# Patient Record
Sex: FEMALE | Race: WHITE | Employment: PART TIME | URBAN - METROPOLITAN AREA
[De-identification: names, ages, dates, MRNs, and addresses within clinical notes are randomized per-mention and may not be internally consistent; named-entity substitution may affect disease eponyms.]

---

## 2018-10-31 ENCOUNTER — OFFICE VISIT (OUTPATIENT)
Dept: INTERNAL MEDICINE CLINIC | Age: 28
End: 2018-10-31

## 2018-10-31 VITALS
TEMPERATURE: 98.9 F | DIASTOLIC BLOOD PRESSURE: 85 MMHG | WEIGHT: 157.6 LBS | SYSTOLIC BLOOD PRESSURE: 110 MMHG | BODY MASS INDEX: 26.91 KG/M2 | HEART RATE: 78 BPM | RESPIRATION RATE: 18 BRPM | OXYGEN SATURATION: 98 % | HEIGHT: 64 IN

## 2018-10-31 DIAGNOSIS — G40.909 SEIZURE DISORDER (HCC): ICD-10-CM

## 2018-10-31 DIAGNOSIS — Z00.00 ROUTINE GENERAL MEDICAL EXAMINATION AT A HEALTH CARE FACILITY: Primary | ICD-10-CM

## 2018-10-31 DIAGNOSIS — F84.0 AUTISM SPECTRUM DISORDER: ICD-10-CM

## 2018-10-31 DIAGNOSIS — F41.1 GAD (GENERALIZED ANXIETY DISORDER): ICD-10-CM

## 2018-10-31 DIAGNOSIS — E55.9 VITAMIN D DEFICIENCY: ICD-10-CM

## 2018-10-31 NOTE — PROGRESS NOTES
Health Maintenance Due   Topic Date Due    DTaP/Tdap/Td series (1 - Tdap) 07/22/2010    PAP AKA CERVICAL CYTOLOGY  07/22/2010    Influenza Age 5 to Adult  08/01/2018       Chief Complaint   Patient presents with    New Patient     Establish Care    Anxiety    Epilepsy     Triggered by anxiety; Last 2016    Weight Gain     Concerned; has one kidney       1. Have you been to the ER, urgent care clinic since your last visit? Hospitalized since your last visit? No    2. Have you seen or consulted any other health care providers outside of the [address] [place] [address] since your last visit? Include any pap smears or colon screening. No    3) Do you have an Advance Directive on file? no    4) Are you interested in receiving information on Advance Directives? NO      Patient is accompanied by self I have received verbal consent from Salvador Mason to discuss any/all medical information while they are present in the room. Patient refused flu shot when offered.

## 2018-10-31 NOTE — PATIENT INSTRUCTIONS

## 2018-10-31 NOTE — PROGRESS NOTES
HISTORY OF PRESENT ILLNESS  Dillon Palacio is a 33 y.o. female here to establish care. 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 She is doing well since then. Had a history of seizure disorder, possible generalized epilepsy or partial seizure, she mentioned her seizure attack is 1 in every 2 years. She is not on antiseizure medications. She has seen and neurologist is in [place] several years back. No recent EEG done last EEG done in approximately during 2001. She has autism, living independently in [facility]. Working full-time. Had generalized anxiety disorder, use Klonopin rarely. Has gained weight. Worried about her weight. Well woman visit is up-to-date. HPI    Review of Systems   Constitutional: Negative. HENT: Negative. Eyes: Negative. Respiratory: Negative. Cardiovascular: Negative. Gastrointestinal: Negative. Genitourinary: Negative. Musculoskeletal: Negative. Skin: Negative. Neurological: Negative. Endo/Heme/Allergies: Negative. Psychiatric/Behavioral: Negative. Physical Exam   Constitutional: She is oriented to person, place, and time. She appears well-developed and well-nourished. No distress. HENT:   Head: Normocephalic and atraumatic. Right Ear: External ear normal.   Left Ear: External ear normal.   Nose: Nose normal.   Mouth/Throat: Oropharynx is clear and moist. No oropharyngeal exudate. Eyes: Conjunctivae and EOM are normal. Pupils are equal, round, and reactive to light. Neck: Normal range of motion. Neck supple. No JVD present. No thyromegaly present. Cardiovascular: Normal rate, regular rhythm, normal heart sounds and intact distal pulses. Pulmonary/Chest: Effort normal and breath sounds normal. No respiratory distress. She has no wheezes. Abdominal: Soft. Bowel sounds are normal. She exhibits no distension. There is no tenderness.    Musculoskeletal: She exhibits no edema or tenderness. Lymphadenopathy:     She has no cervical adenopathy. Neurological: She is alert and oriented to person, place, and time. She has normal reflexes. She displays normal reflexes. No cranial nerve deficit. She exhibits normal muscle tone. Coordination normal.   Skin:   Skin freckle present   Psychiatric: She has a normal mood and affect. Her behavior is normal. Judgment and thought content normal.   Autistic       ASSESSMENT and PLAN  Diagnoses and all orders for this visit:    1. Routine general medical examination at a health care facility    seems healthy. Advised to eat healthy and exercise. Will check,  -     CBC WITH AUTOMATED DIFF  -     METABOLIC PANEL, COMPREHENSIVE  -     LIPID PANEL  -     TSH 3RD GENERATION  -     URINALYSIS W/ RFLX MICROSCOPIC    2. Seizure disorder ([facility] [address]    Had history of partial seizure or epilepsy. Not on antiseizure medications. She gets seizure attack one in every 2 years. Last EEG done at least 10 years back. Would like to get reevaluated. Will refer,  -     REFERRAL TO NEUROLOGY    3. Vitamin D deficiency   will check,      -     VITAMIN D, 25 HYDROXY    4. NURY (generalized anxiety disorder)    Taking Klonopin as needed. 5. Autism spectrum disorder    Stable. Living independently. Discussed expected course/resolution/complications of diagnosis in detail with patient. Medication risks/benefits/costs/interactions/alternatives discussed with patient. Pt was given an after visit summary which includes diagnoses, current medications & vitals. Pt expressed understanding with the diagnosis and plan.

## 2019-02-07 ENCOUNTER — TELEPHONE (OUTPATIENT)
Dept: INTERNAL MEDICINE CLINIC | Age: 29
End: 2019-02-07

## 2019-02-07 NOTE — TELEPHONE ENCOUNTER
Call placed to ptDIPAK on VM stating for pt to call and schedule an appointment or that she can wait until follow up appointment to discuss whatever cancer screening that she is inquiring about

## 2019-02-07 NOTE — TELEPHONE ENCOUNTER
----- Message from Darrel Hart sent at 2/6/2019  4:39 PM EST -----  Regarding: DR DELGADO / TELEPHONE  Pt is requesting cancer screening paperwork to be emailed [email or web address]        Best Contact: [phone]

## 2019-02-16 LAB
25(OH)D3+25(OH)D2 SERPL-MCNC: 22.2 NG/ML (ref 30–100)
ALBUMIN SERPL-MCNC: 4.3 G/DL (ref 3.5–5.5)
ALBUMIN/GLOB SERPL: 1.3 {RATIO} (ref 1.2–2.2)
ALP SERPL-CCNC: 93 IU/L (ref 39–117)
ALT SERPL-CCNC: 28 IU/L (ref 0–32)
APPEARANCE UR: CLEAR
AST SERPL-CCNC: 18 IU/L (ref 0–40)
BASOPHILS # BLD AUTO: 0 X10E3/UL (ref 0–0.2)
BASOPHILS NFR BLD AUTO: 0 %
BILIRUB SERPL-MCNC: 0.2 MG/DL (ref 0–1.2)
BILIRUB UR QL STRIP: NEGATIVE
BUN SERPL-MCNC: 11 MG/DL (ref 6–20)
BUN/CREAT SERPL: 16 (ref 9–23)
CALCIUM SERPL-MCNC: 9.3 MG/DL (ref 8.7–10.2)
CHLORIDE SERPL-SCNC: 104 MMOL/L (ref 96–106)
CHOLEST SERPL-MCNC: 165 MG/DL (ref 100–199)
CO2 SERPL-SCNC: 20 MMOL/L (ref 20–29)
COLOR UR: YELLOW
CREAT SERPL-MCNC: 0.67 MG/DL (ref 0.57–1)
EOSINOPHIL # BLD AUTO: 0.1 X10E3/UL (ref 0–0.4)
EOSINOPHIL NFR BLD AUTO: 1 %
ERYTHROCYTE [DISTWIDTH] IN BLOOD BY AUTOMATED COUNT: 12.7 % (ref 12.3–15.4)
GLOBULIN SER CALC-MCNC: 3.3 G/DL (ref 1.5–4.5)
GLUCOSE SERPL-MCNC: 86 MG/DL (ref 65–99)
GLUCOSE UR QL: NEGATIVE
HCT VFR BLD AUTO: 41.9 % (ref 34–46.6)
HDLC SERPL-MCNC: 42 MG/DL
HGB BLD-MCNC: 13.5 G/DL (ref 11.1–15.9)
HGB UR QL STRIP: NEGATIVE
IMM GRANULOCYTES # BLD AUTO: 0 X10E3/UL (ref 0–0.1)
IMM GRANULOCYTES NFR BLD AUTO: 0 %
INTERPRETATION, 910389: NORMAL
KETONES UR QL STRIP: NEGATIVE
LDLC SERPL CALC-MCNC: 89 MG/DL (ref 0–99)
LEUKOCYTE ESTERASE UR QL STRIP: NEGATIVE
LYMPHOCYTES # BLD AUTO: 2.5 X10E3/UL (ref 0.7–3.1)
LYMPHOCYTES NFR BLD AUTO: 29 %
MCH RBC QN AUTO: 28.4 PG (ref 26.6–33)
MCHC RBC AUTO-ENTMCNC: 32.2 G/DL (ref 31.5–35.7)
MCV RBC AUTO: 88 FL (ref 79–97)
MICRO URNS: NORMAL
MONOCYTES # BLD AUTO: 0.9 X10E3/UL (ref 0.1–0.9)
MONOCYTES NFR BLD AUTO: 10 %
NEUTROPHILS # BLD AUTO: 5.2 X10E3/UL (ref 1.4–7)
NEUTROPHILS NFR BLD AUTO: 60 %
NITRITE UR QL STRIP: NEGATIVE
PH UR STRIP: 6 [PH] (ref 5–7.5)
PLATELET # BLD AUTO: 409 X10E3/UL (ref 150–379)
POTASSIUM SERPL-SCNC: 4.3 MMOL/L (ref 3.5–5.2)
PROT SERPL-MCNC: 7.6 G/DL (ref 6–8.5)
PROT UR QL STRIP: NEGATIVE
RBC # BLD AUTO: 4.75 X10E6/UL (ref 3.77–5.28)
SODIUM SERPL-SCNC: 140 MMOL/L (ref 134–144)
SP GR UR: 1.02 (ref 1–1.03)
TRIGL SERPL-MCNC: 172 MG/DL (ref 0–149)
TSH SERPL DL<=0.005 MIU/L-ACNC: 1.69 UIU/ML (ref 0.45–4.5)
UROBILINOGEN UR STRIP-MCNC: 1 MG/DL (ref 0.2–1)
VLDLC SERPL CALC-MCNC: 34 MG/DL (ref 5–40)
WBC # BLD AUTO: 8.7 X10E3/UL (ref 3.4–10.8)

## 2019-02-20 NOTE — PROGRESS NOTES
High platelet count. Probable thrombocytosis. Refer patient to hematologist Dr. Jazlyn Hughes. Low vitamin D, advised to take vitamin D3 2000 units a day for 4 months. Slightly elevated triglyceride level, watch carbohydrate.

## 2019-04-29 ENCOUNTER — OFFICE VISIT (OUTPATIENT)
Dept: INTERNAL MEDICINE CLINIC | Age: 29
End: 2019-04-29

## 2019-04-29 VITALS
OXYGEN SATURATION: 97 % | RESPIRATION RATE: 18 BRPM | BODY MASS INDEX: 25.88 KG/M2 | DIASTOLIC BLOOD PRESSURE: 69 MMHG | WEIGHT: 151.6 LBS | HEIGHT: 64 IN | SYSTOLIC BLOOD PRESSURE: 133 MMHG | HEART RATE: 91 BPM | TEMPERATURE: 97.6 F

## 2019-04-29 DIAGNOSIS — F41.1 GAD (GENERALIZED ANXIETY DISORDER): ICD-10-CM

## 2019-04-29 DIAGNOSIS — R79.89 ELEVATED PLATELET COUNT: ICD-10-CM

## 2019-04-29 DIAGNOSIS — E55.9 VITAMIN D DEFICIENCY: ICD-10-CM

## 2019-04-29 DIAGNOSIS — L24.5 IRRITANT CONTACT DERMATITIS DUE TO OTHER CHEMICAL PRODUCTS: Primary | ICD-10-CM

## 2019-04-29 LAB — PLATELET # BLD AUTO: 420 X10E3/UL (ref 150–379)

## 2019-04-29 RX ORDER — BETAMETHASONE VALERATE 1.2 MG/G
OINTMENT TOPICAL DAILY
Qty: 15 G | Refills: 0 | Status: SHIPPED | OUTPATIENT
Start: 2019-04-29 | End: 2021-07-17

## 2019-04-29 RX ORDER — PREDNISONE 5 MG/1
5 TABLET ORAL 2 TIMES DAILY
Qty: 14 TAB | Refills: 0 | Status: SHIPPED | OUTPATIENT
Start: 2019-04-29 | End: 2020-07-07 | Stop reason: ALTCHOICE

## 2019-04-29 RX ORDER — CLONAZEPAM 0.5 MG/1
0.5 TABLET ORAL AS NEEDED
Qty: 30 TAB | Refills: 2 | Status: SHIPPED | OUTPATIENT
Start: 2019-04-29

## 2019-04-29 NOTE — PROGRESS NOTES
Asha Wynne is a 33 y.o. female    Chief Complaint   Patient presents with    Skin Problem    Anxiety    Seizure     1. Have you been to the ER, urgent care clinic since your last visit? Hospitalized since your last visit? No     2. Have you seen or consulted any other health care providers outside of the [address] [place] [address] since your last visit? Include any pap smears or colon screening.   No      Visit Vitals  /69   Pulse 91   Temp 97.6 °F (36.4 °C) (Oral)   Resp 18   Ht 5' 4\" (1.626 m)   Wt 151 lb 9.6 oz (68.8 kg)   SpO2 97%   BMI 26.02 kg/m²

## 2019-04-29 NOTE — LETTER
NOTIFICATION RETURN TO WORK / SCHOOL 
 
4/29/2019 10:03 AM 
 
Ms. Mathew Miles [place] [address] [id number] To Whom It May Concern: 
 
Mathew Miles is currently under the care of [address]. She is advised not to be in contact with any chemical product since she has developed contact dermatitis. If there are questions or concerns please have the patient contact our office. Sincerely, Joellen Dyer MD

## 2019-04-29 NOTE — PATIENT INSTRUCTIONS

## 2019-04-29 NOTE — PROGRESS NOTES
HISTORY OF PRESENT ILLNESS  Miryam Ivy is a 33 y.o. female here to follow-up. Reports rash in both hands. 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 Chemical product. Hands are itchy, having rash. Her mom gave her topical cream, not helping her. Have anxiety attack, she is not coming to college to finish her bachelor's. Sleeping well. Need a refill of Klonopin. She has autism, living independently in [place]. Working full-time. Labs reviewed with her. Platelet count is high. Repeated. Has low vitamin D. HPI      Review of Systems   Constitutional: Negative. HENT: Negative. Eyes: Negative. Respiratory: Negative. Cardiovascular: Negative. Gastrointestinal: Negative. Genitourinary: Negative. Musculoskeletal: Negative. Skin: Positive for itching and rash. Neurological: Negative. Endo/Heme/Allergies: Negative. Psychiatric/Behavioral: The patient is nervous/anxious. Physical Exam   Constitutional: She appears well-developed and well-nourished. No distress. Eyes: Pupils are equal, round, and reactive to light. Conjunctivae and EOM are normal.   Neck: Normal range of motion. Neck supple. No JVD present. No thyromegaly present. Cardiovascular: Normal rate, regular rhythm, normal heart sounds and intact distal pulses. Pulmonary/Chest: Effort normal and breath sounds normal. No respiratory distress. She has no wheezes. Abdominal: Soft. Bowel sounds are normal. She exhibits no distension. There is no tenderness. Musculoskeletal: She exhibits no edema or tenderness. Lymphadenopathy:     She has no cervical adenopathy. Skin: Skin is dry. Rash noted. Bilateral hand: Dry scaly lesion and sandpaper lesion present in all fingers and hands. Itchy. Psychiatric: She has a normal mood and affect.  Her behavior is normal.   Autistic,anxious       ASSESSMENT and PLAN    Diagnoses and all orders for this visit:    1. Irritant contact dermatitis due to other chemical products    She needs to avoid chemicals at work. Will write a letter. Will give,  -     betamethasone valerate (VALISONE) 0.1 % ointment; Apply  to affected area daily. -     predniSONE (DELTASONE) 5 mg tablet; Take 1 Tab by mouth two (2) times a day. 2. Elevated platelet count    Will repeat,  -     PLATELET COUNT    If elevated, will refer,  -     REFERRAL TO HEMATOLOGY ONCOLOGY    3. Vitamin D deficiency     advised to take vitamin D3 2000 units once a day for 4-month. 4. NURY (generalized anxiety disorder)    Doing well. Will refill,  -     clonazePAM (KLONOPIN) 0.5 mg tablet; Take 1 Tab by mouth as needed for Other (anxiety). Max Daily Amount: 48 mg. #30, with 2 refill. Follow-up in 4 months. Discussed expected course/resolution/complications of diagnosis in detail with patient. Medication risks/benefits/costs/interactions/alternatives discussed with patient. Pt was given an after visit summary which includes diagnoses, current medications & vitals. Pt expressed understanding with the diagnosis and plan.

## 2019-04-30 ENCOUNTER — TELEPHONE (OUTPATIENT)
Dept: INTERNAL MEDICINE CLINIC | Age: 29
End: 2019-04-30

## 2019-05-01 NOTE — TELEPHONE ENCOUNTER
Call placed to [facility], patients place of employment, and she needed clarification regarding patients job. 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

## 2019-05-01 NOTE — TELEPHONE ENCOUNTER
----- Message from Maureen Dolan sent at 5/1/2019 10:07 AM EDT -----  Regarding: Dr. Consuello Denver  Pt returning a call from the practice but is unsure of the nature of the call.  Callback: [phone]

## 2019-05-03 DIAGNOSIS — D69.1 PLATELET DYSFUNCTION (HCC): Primary | ICD-10-CM

## 2019-05-08 ENCOUNTER — OFFICE VISIT (OUTPATIENT)
Dept: ONCOLOGY | Age: 29
End: 2019-05-08

## 2019-05-08 VITALS
SYSTOLIC BLOOD PRESSURE: 137 MMHG | OXYGEN SATURATION: 98 % | DIASTOLIC BLOOD PRESSURE: 80 MMHG | BODY MASS INDEX: 26.05 KG/M2 | RESPIRATION RATE: 16 BRPM | HEART RATE: 100 BPM | HEIGHT: 64 IN | WEIGHT: 152.6 LBS | TEMPERATURE: 98.4 F

## 2019-05-08 DIAGNOSIS — F84.0 AUTISM: ICD-10-CM

## 2019-05-08 DIAGNOSIS — D75.839 THROMBOCYTOSIS: Primary | ICD-10-CM

## 2019-05-08 DIAGNOSIS — D75.839 THROMBOCYTOSIS: ICD-10-CM

## 2019-05-08 NOTE — PROGRESS NOTES
[facility] [place] [address], [place]: [phone]  F: [phone]    Reason for Visit:   Max Evans is a 33 y.o. female who is seen in consultation at the request of Dr. Pastor Centeno for evaluation of thrombocytosis      History of Present Illness:   Patient is a 33 y.o. female seen for thrombocytosis     She was noted to have an abnormal platelet count of 427I on 2/15/2019 which remained elevated when rechecked on 4/29/2019 at 420 okay. No other CBC abnormalities. She has no bleeding, no dark stools, she eats a normal diet, no fevers and no chills, no new lumps or bumps. She has had no vision changes. She was on prednisone for contact dermatitis that she started 4/29/19. She had dermatitis for 1.5 month prior to that. No CP,  SOB, HA, falls, dysuria, discharge. She had a Wilms tumor that was resected in 1991. Past Medical History:   Diagnosis Date    Autism disorder     Cancer ([facility] [address]     kidney,wilms tumor    Psychotic disorder ([facility] [address]     NURY    Seizure ([facility] [address]     Seizures ([facility] [address]     ? epilepsy    Wilm's tumor (nephroblastoma) ([facility] [address]       Past Surgical History:   Procedure Laterality Date    HX UROLOGICAL      nephrectomy not sure which side      Social History     Tobacco Use    Smoking status: Never Smoker    Smokeless tobacco: Never Used   Substance Use Topics    Alcohol use: No      Family History   Adopted: Yes     Current Outpatient Medications   Medication Sig    clonazePAM (KLONOPIN) 0.5 mg tablet Take 1 Tab by mouth as needed for Other (anxiety). Max Daily Amount: 48 mg.  levonorgestrel-ethinyl estradiol (SEASONALE CONTRACEPTIVE) 0.15-30 mg-mcg per tablet Take 1 Tab by mouth daily. Indications: PREGNANCY CONTRACEPTION    betamethasone valerate (VALISONE) 0.1 % ointment Apply  to affected area daily.  predniSONE (DELTASONE) 5 mg tablet Take 1 Tab by mouth two (2) times a day.      No current facility-administered medications for this visit. Allergies   Allergen Reactions    Penicillins Other (comments)     Severe depression        Review of Systems: A complete review of systems was obtained, negative except as described above. Physical Exam:     Visit Vitals  Ht 5' 4\" (1.626 m)   Wt 152 lb 9.6 oz (69.2 kg)   LMP 02/08/2019 (Approximate)   BMI 26.19 kg/m²     ECOG PS: 0  General: No distress  Eyes: PERRLA, anicteric sclerae  HENT: Atraumatic, OP clear  Neck: Supple  Lymphatic: No cervical, supraclavicular, or inguinal adenopathy  Respiratory: CTAB, normal respiratory effort  CV: Normal rate, regular rhythm, no murmurs, no peripheral edema  GI: Soft, nontender, nondistended, no masses, no hepatomegaly, no splenomegaly  MS: Normal gait and station. Digits without clubbing or cyanosis. Skin: No rashes, ecchymoses, or petechiae. Normal temperature, turgor, and texture. Psych: Alert, oriented, appropriate affect, normal judgment/insight    Results:     Lab Results   Component Value Date/Time    WBC 8.7 02/15/2019 03:35 PM    HGB 13.5 02/15/2019 03:35 PM    HCT 41.9 02/15/2019 03:35 PM    PLATELET 636 (H) 36/60/3330 10:34 AM    MCV 88 02/15/2019 03:35 PM    ABS. NEUTROPHILS 5.2 02/15/2019 03:35 PM     Lab Results   Component Value Date/Time    Sodium 140 02/15/2019 03:35 PM    Potassium 4.3 02/15/2019 03:35 PM    Chloride 104 02/15/2019 03:35 PM    CO2 20 02/15/2019 03:35 PM    Glucose 86 02/15/2019 03:35 PM    BUN 11 02/15/2019 03:35 PM    Creatinine 0.67 02/15/2019 03:35 PM    GFR est  02/15/2019 03:35 PM    GFR est non- 02/15/2019 03:35 PM    Calcium 9.3 02/15/2019 03:35 PM     Lab Results   Component Value Date/Time    Bilirubin, total 0.2 02/15/2019 03:35 PM    ALT (SGPT) 28 02/15/2019 03:35 PM    AST (SGOT) 18 02/15/2019 03:35 PM    Alk.  phosphatase 93 02/15/2019 03:35 PM    Protein, total 7.6 02/15/2019 03:35 PM    Albumin 4.3 02/15/2019 03:35 PM    Globulin 4.3 (H) 03/22/2014 02:30 PM     Lab Results Component Value Date/Time    TSH 1.690 02/15/2019 03:35 PM     No results found for: INR, APTT, DDIMSQ, DDIME, [id number], Philmichaelon Ahle, FDPLT, Chrystie Bold, PRSFLT, [address], [address], [id number], [id number], [id number], [id number], [id number], [id number]    Records reviewed and summarized above. Pathology report(s) reviewed above. Radiology report(s) reviewed above. Assessment:   1) Thrombocytosis    Mild but persistent  Discussed causes of thrombocytosis which may be reactive or autonomous    Will r/o reactive causes and splenomegaly  If no red flags will monitor as my suspicion for a MPN is low      2) H/O Wilms tumor      3) Autism      4) Seizure disorder      Plan:     · Cbc diff, smear, LD, iron studies, USG abdomen    RTC 3-4 weeks    I appreciate the opportunity to participate in Ms. Marzena Santos's care.     Signed By: Rosalino Gu MD

## 2019-05-08 NOTE — PROGRESS NOTES
Reviewed record in preparation for visit and have obtained necessary documentation. Identified pt with two pt identifiers(name and ). Health Maintenance Due   Topic    DTaP/Tdap/Td series (1 - Tdap)    PAP AKA CERVICAL CYTOLOGY          Chief Complaint   Patient presents with    New Patient     Ref by Dr. Bailey Mcadams. DX: Elevated Platelets        Wt Readings from Last 3 Encounters:   19 152 lb 9.6 oz (69.2 kg)   19 151 lb 9.6 oz (68.8 kg)   10/31/18 157 lb 9.6 oz (71.5 kg)     Temp Readings from Last 3 Encounters:   19 97.6 °F (36.4 °C) (Oral)   10/31/18 98.9 °F (37.2 °C) (Oral)   14 98 °F (36.7 °C)     BP Readings from Last 3 Encounters:   19 133/69   10/31/18 110/85   14 108/66     Pulse Readings from Last 3 Encounters:   19 91   10/31/18 78   14 86           Learning Assessment:  :     Learning Assessment 2019 10/31/2018   PRIMARY LEARNER Patient Patient   HIGHEST LEVEL OF EDUCATION - PRIMARY LEARNER  SOME COLLEGE SOME COLLEGE   BARRIERS PRIMARY LEARNER NONE NONE   PRIMARY LANGUAGE ENGLISH ENGLISH   LEARNER PREFERENCE PRIMARY VIDEOS DEMONSTRATION   ANSWERED BY patient Patient   RELATIONSHIP SELF SELF       Depression Screening:  :     3 most recent PHQ Screens 2019   Little interest or pleasure in doing things Not at all   Feeling down, depressed, irritable, or hopeless Not at all   Total Score PHQ 2 0       Fall Risk Assessment:  :     Fall Risk Assessment, last 12 mths 2019   Able to walk? Yes   Fall in past 12 months? No       Abuse Screening:  :     Abuse Screening Questionnaire 2019 10/31/2018   Do you ever feel afraid of your partner? N N   Are you in a relationship with someone who physically or mentally threatens you? N N   Is it safe for you to go home? Y Y       Coordination of Care Questionnaire:  :     1) Have you been to an emergency room, urgent care clinic since your last visit?  Urgent care 3/2019 DX: Allergies  Hospitalized since your last visit? no             2) Have you seen or consulted any other health care providers outside of [address] [place] [address] since your last visit? no  (Include any pap smears or colon screenings in this section.)    3) Do you have an Advance Directive on file? no    4) Are you interested in receiving information on Advance Directives? NO      Patient is accompanied by friend/  I have received verbal consent from Francesco Burton to discuss any/all medical information while they are present in the room.

## 2019-05-13 ENCOUNTER — HOSPITAL ENCOUNTER (OUTPATIENT)
Dept: ULTRASOUND IMAGING | Age: 29
Discharge: HOME | End: 2019-05-13
Attending: INTERNAL MEDICINE
Payer: COMMERCIAL

## 2019-05-13 DIAGNOSIS — D75.839 THROMBOCYTOSIS: ICD-10-CM

## 2019-05-13 PROCEDURE — 76700 US EXAM ABDOM COMPLETE: CPT

## 2019-05-14 LAB
BASOPHILS # BLD AUTO: 0 X10E3/UL (ref 0–0.2)
BASOPHILS NFR BLD AUTO: 0 %
EOSINOPHIL # BLD AUTO: 0.1 X10E3/UL (ref 0–0.4)
EOSINOPHIL NFR BLD AUTO: 1 %
ERYTHROCYTE [DISTWIDTH] IN BLOOD BY AUTOMATED COUNT: 13.7 % (ref 12.3–15.4)
FERRITIN SERPL-MCNC: 69 NG/ML (ref 15–150)
HCT VFR BLD AUTO: 40.5 % (ref 34–46.6)
HGB BLD-MCNC: 13.5 G/DL (ref 11.1–15.9)
IMM GRANULOCYTES # BLD AUTO: 0 X10E3/UL (ref 0–0.1)
IMM GRANULOCYTES NFR BLD AUTO: 0 %
IRON SATN MFR SERPL: 31 % (ref 15–55)
IRON SERPL-MCNC: 125 UG/DL (ref 27–159)
LDH SERPL-CCNC: 257 IU/L (ref 119–226)
LYMPHOCYTES # BLD AUTO: 2.2 X10E3/UL (ref 0.7–3.1)
LYMPHOCYTES NFR BLD AUTO: 23 %
MCH RBC QN AUTO: 28.5 PG (ref 26.6–33)
MCHC RBC AUTO-ENTMCNC: 33.3 G/DL (ref 31.5–35.7)
MCV RBC AUTO: 85 FL (ref 79–97)
MONOCYTES # BLD AUTO: 0.8 X10E3/UL (ref 0.1–0.9)
MONOCYTES NFR BLD AUTO: 9 %
NEUTROPHILS # BLD AUTO: 6.2 X10E3/UL (ref 1.4–7)
NEUTROPHILS NFR BLD AUTO: 67 %
PLATELET # BLD AUTO: 362 X10E3/UL (ref 150–379)
RBC # BLD AUTO: 4.74 X10E6/UL (ref 3.77–5.28)
TIBC SERPL-MCNC: 399 UG/DL (ref 250–450)
UIBC SERPL-MCNC: 274 UG/DL (ref 131–425)
WBC # BLD AUTO: 9.4 X10E3/UL (ref 3.4–10.8)

## 2019-05-21 ENCOUNTER — OFFICE VISIT (OUTPATIENT)
Dept: ONCOLOGY | Age: 29
End: 2019-05-21

## 2019-05-21 VITALS
WEIGHT: 152.4 LBS | BODY MASS INDEX: 26.02 KG/M2 | HEIGHT: 64 IN | RESPIRATION RATE: 18 BRPM | OXYGEN SATURATION: 97 % | HEART RATE: 104 BPM | SYSTOLIC BLOOD PRESSURE: 111 MMHG | TEMPERATURE: 98.6 F | DIASTOLIC BLOOD PRESSURE: 73 MMHG

## 2019-05-21 DIAGNOSIS — D75.839 THROMBOCYTOSIS: Primary | ICD-10-CM

## 2019-05-21 DIAGNOSIS — Q44.7: ICD-10-CM

## 2019-05-21 DIAGNOSIS — F84.0 AUTISM: ICD-10-CM

## 2019-05-21 NOTE — PROGRESS NOTES
[facility] [address] [place], [address], [place] [facility]: [phone]  F: [phone]    Reason for Visit:   Francesco Burton is a 33 y.o. female who is seen in follow up for  thrombocytosis    History of Present Illness:   Patient is a 33 y.o. female seen for thrombocytosis     She was noted to have an abnormal platelet count of 827M on 2/15/2019 which remained elevated when rechecked on 4/29/2019 at 420k. No other CBC abnormalities. She was on prednisone for contact dermatitis that she started 4/29/19. She had dermatitis for 1.5 month prior to that. This is since resolved. She also has a history of Wilms tumor that was resected in [facility] with her supportive father today. Has no additional lesions of dermatitis since last seen. No bleeding no fevers no abdominal pain no weight changes. Past Medical History:   Diagnosis Date    Autism disorder     Cancer ([facility] [address]     kidney,wilms tumor    Psychotic disorder ([facility] [address]     NURY    Seizure ([facility] [address]     Seizures ([address]     ? epilepsy    Wilm's tumor (nephroblastoma) ([facility] [address]       Past Surgical History:   Procedure Laterality Date    HX UROLOGICAL      nephrectomy not sure which side      Social History     Tobacco Use    Smoking status: Never Smoker    Smokeless tobacco: Never Used   Substance Use Topics    Alcohol use: No      Family History   Adopted: Yes     Current Outpatient Medications   Medication Sig    clonazePAM (KLONOPIN) 0.5 mg tablet Take 1 Tab by mouth as needed for Other (anxiety). Max Daily Amount: 48 mg.  levonorgestrel-ethinyl estradiol (SEASONALE CONTRACEPTIVE) 0.15-30 mg-mcg per tablet Take 1 Tab by mouth daily. Indications: PREGNANCY CONTRACEPTION    betamethasone valerate (VALISONE) 0.1 % ointment Apply  to affected area daily.  predniSONE (DELTASONE) 5 mg tablet Take 1 Tab by mouth two (2) times a day. No current facility-administered medications for this visit.        Allergies Allergen Reactions    Penicillins Other (comments)     Severe depression        Review of Systems: A complete review of systems was obtained, negative except as described above. Physical Exam:     Visit Vitals  /73 (BP 1 Location: Left arm, BP Patient Position: Sitting)   Pulse (!) 104   Temp 98.6 °F (37 °C) (Oral)   Resp 18   Ht 5' 4\" (1.626 m)   Wt 152 lb 6.4 oz (69.1 kg)   LMP 05/07/2019 (Approximate)   SpO2 97%   BMI 26.16 kg/m²     ECOG PS: 0  General: No distress  Eyes: PERRLA, anicteric sclerae  HENT: Atraumatic, OP clear  Neck: Supple  Skin: No rashes, ecchymoses, or petechiae. Normal temperature, turgor, and texture. Psych: Alert, oriented, appropriate affect, normal judgment/insight    Results:     Lab Results   Component Value Date/Time    WBC 9.4 05/13/2019 10:01 AM    HGB 13.5 05/13/2019 10:01 AM    HCT 40.5 05/13/2019 10:01 AM    PLATELET 146 91/96/6253 10:01 AM    MCV 85 05/13/2019 10:01 AM    ABS. NEUTROPHILS 6.2 05/13/2019 10:01 AM     Lab Results   Component Value Date/Time    Sodium 140 02/15/2019 03:35 PM    Potassium 4.3 02/15/2019 03:35 PM    Chloride 104 02/15/2019 03:35 PM    CO2 20 02/15/2019 03:35 PM    Glucose 86 02/15/2019 03:35 PM    BUN 11 02/15/2019 03:35 PM    Creatinine 0.67 02/15/2019 03:35 PM    GFR est  02/15/2019 03:35 PM    GFR est non- 02/15/2019 03:35 PM    Calcium 9.3 02/15/2019 03:35 PM     Lab Results   Component Value Date/Time    Bilirubin, total 0.2 02/15/2019 03:35 PM    ALT (SGPT) 28 02/15/2019 03:35 PM    AST (SGOT) 18 02/15/2019 03:35 PM    Alk.  phosphatase 93 02/15/2019 03:35 PM    Protein, total 7.6 02/15/2019 03:35 PM    Albumin 4.3 02/15/2019 03:35 PM    Globulin 4.3 (H) 03/22/2014 02:30 PM     Lab Results   Component Value Date/Time    Iron % saturation 31 05/13/2019 10:01 AM    TIBC 399 05/13/2019 10:01 AM    Ferritin 69 05/13/2019 10:01 AM     (H) 05/13/2019 10:01 AM    TSH 1.690 02/15/2019 03:35 PM     No results found for: INR, APTT, DDIMSQ, DDIME, [id number], Maudie Leyland, FDPLT, Nelma Lauth, [address] [facility] [address], [facility], [id number], [id number], [id number], [id number], [id number], [id number]    Records reviewed and summarized above. Pathology report(s) reviewed above. Radiology report(s) reviewed above. USG abdomen  IMPRESSION  IMPRESSION:   1. Multiple hyperechoic lesions within the liver. These may represent  hemangiomas. If any preexisting imaging confirms this diagnosis comparison is  recommended alternatively MR imaging with contrast could confirm  2. The spleen is not enlarged  3. Post right nephrectomy    Assessment:   1) Thrombocytosis    Mild but persistent  Discussed causes of thrombocytosis which may be reactive or autonomous. Perhaps reactive to contact dermatitis. Thrombocytosis on repeat CBC has resolved. She has no evidence of iron deficient no other CBC abnormalities. Can continue to have her CBC observed with her primary care physician for now. I reviewed ultrasound findings with her parents today. Multiple lesions seen in the liver which are likely hemangiomas. They are quite concerned given her history of lymphedema. I reassured them that this is likely a benign finding but will proceed with an MRI abdomen as was recommended by radiology and call with the results. 2) H/O Wilms tumor  I generally do not see patients with Wilms tumor as this is a pediatric disease. However I reviewed guidelines and it does not appear that any continued surveillance is recommended. 3) Autism      4) Seizure disorder      Plan:     MRI abdomen and will call with results   Otherwise she should be okay to be monitored every year with Dr. Ruthie Diaz. If she has recurrent persistent thrombocytosis I would be happy to see her again. RTC prn    I appreciate the opportunity to participate in Ms. Isabel Santos's care.     Signed By: [place] MD

## 2019-05-21 NOTE — PROGRESS NOTES
Reviewed record in preparation for visit and have obtained necessary documentation. Identified pt with two pt identifiers(name and ). Health Maintenance Due   Topic    DTaP/Tdap/Td series (1 - Tdap)    PAP AKA CERVICAL CYTOLOGY          Chief Complaint   Patient presents with    Follow-up     3 wks         Wt Readings from Last 3 Encounters:   19 152 lb 6.4 oz (69.1 kg)   19 152 lb 9.6 oz (69.2 kg)   19 151 lb 9.6 oz (68.8 kg)     Temp Readings from Last 3 Encounters:   19 98.4 °F (36.9 °C) (Oral)   19 97.6 °F (36.4 °C) (Oral)   10/31/18 98.9 °F (37.2 °C) (Oral)     BP Readings from Last 3 Encounters:   19 137/80   19 133/69   10/31/18 110/85     Pulse Readings from Last 3 Encounters:   19 100   19 91   10/31/18 78           Learning Assessment:  :     Learning Assessment 2019 10/31/2018   PRIMARY LEARNER Patient Patient   HIGHEST LEVEL OF EDUCATION - PRIMARY LEARNER  SOME COLLEGE SOME COLLEGE   BARRIERS PRIMARY LEARNER NONE NONE   PRIMARY LANGUAGE ENGLISH ENGLISH   LEARNER PREFERENCE PRIMARY VIDEOS DEMONSTRATION   ANSWERED BY patient Patient   RELATIONSHIP SELF SELF       Depression Screening:  :     3 most recent PHQ Screens 2019   Little interest or pleasure in doing things Not at all   Feeling down, depressed, irritable, or hopeless Not at all   Total Score PHQ 2 0       Fall Risk Assessment:  :     Fall Risk Assessment, last 12 mths 2019   Able to walk? Yes   Fall in past 12 months? No       Abuse Screening:  :     Abuse Screening Questionnaire 2019 10/31/2018   Do you ever feel afraid of your partner? N N   Are you in a relationship with someone who physically or mentally threatens you? N N   Is it safe for you to go home?  Y Y       Coordination of Care Questionnaire:  :     1) Have you been to an emergency room, urgent care clinic since your last visit? no   Hospitalized since your last visit? no             2) Have you seen or consulted any other health care providers outside of [address] [place] [address] since your last visit? no  (Include any pap smears or colon screenings in this section.)    3) Do you have an Advance Directive on file? no    4) Are you interested in receiving information on Advance Directives? NO      Patient is accompanied by father I have received verbal consent from Nima Chase to discuss any/all medical information while they are present in the room.

## 2019-05-28 ENCOUNTER — TELEPHONE (OUTPATIENT)
Dept: ONCOLOGY | Age: 29
End: 2019-05-28

## 2019-05-28 NOTE — TELEPHONE ENCOUNTER
Patients father Ruthie Espinosa called and stated that the patients MRI is with contrast and he would like to know if it is okay stating that she only has one kidney.      # [phone]

## 2019-05-28 NOTE — TELEPHONE ENCOUNTER
Called the patient's father, Lily Silva, who is listed on the patient's HIPAA release form and verified the patient's ID x 2. Informed the patient that per Paris Bean NP since her creatinine clearance has been within normal limits the MRI with contrast is ok. Vete verbalized understanding and denied any further questions or concerns.

## 2019-06-07 ENCOUNTER — HOSPITAL ENCOUNTER (OUTPATIENT)
Dept: MRI IMAGING | Age: 29
Discharge: HOME | End: 2019-06-07
Attending: INTERNAL MEDICINE
Payer: COMMERCIAL

## 2019-06-07 VITALS — BODY MASS INDEX: 26.09 KG/M2 | WEIGHT: 152 LBS

## 2019-06-07 DIAGNOSIS — Q44.7: ICD-10-CM

## 2019-06-07 DIAGNOSIS — D75.839 THROMBOCYTOSIS: ICD-10-CM

## 2019-06-07 PROCEDURE — 74011250636 HC RX REV CODE- 250/636: Performed by: INTERNAL MEDICINE

## 2019-06-07 PROCEDURE — A9585 GADOBUTROL INJECTION: HCPCS | Performed by: INTERNAL MEDICINE

## 2019-06-07 PROCEDURE — 74183 MRI ABD W/O CNTR FLWD CNTR: CPT

## 2019-06-07 RX ADMIN — GADOBUTROL 7 ML: 604.72 INJECTION INTRAVENOUS at 10:07

## 2019-06-11 ENCOUNTER — TELEPHONE (OUTPATIENT)
Dept: INFUSION THERAPY | Age: 29
End: 2019-06-11

## 2019-06-11 NOTE — TELEPHONE ENCOUNTER
Patient ashutosh ford called and would like the results to the patient MRI. He would like a callback.     [phone]

## 2019-06-13 DIAGNOSIS — Q44.7: Primary | ICD-10-CM

## 2019-06-13 DIAGNOSIS — D13.4 ADENOMA OF LIVER: ICD-10-CM

## 2019-10-04 ENCOUNTER — OFFICE VISIT (OUTPATIENT)
Dept: HEMATOLOGY | Age: 29
End: 2019-10-04

## 2019-10-04 VITALS
OXYGEN SATURATION: 97 % | BODY MASS INDEX: 25.16 KG/M2 | HEART RATE: 83 BPM | SYSTOLIC BLOOD PRESSURE: 113 MMHG | TEMPERATURE: 98.5 F | DIASTOLIC BLOOD PRESSURE: 69 MMHG | HEIGHT: 64 IN | WEIGHT: 147.4 LBS | RESPIRATION RATE: 18 BRPM

## 2019-10-04 DIAGNOSIS — R16.0 LIVER MASS: Primary | ICD-10-CM

## 2019-10-04 DIAGNOSIS — C64.9 NEPHROBLASTOMA, UNSPECIFIED LATERALITY (HCC): ICD-10-CM

## 2019-10-04 PROBLEM — F41.9 ANXIETY: Status: ACTIVE | Noted: 2019-10-04

## 2019-10-04 PROBLEM — Z90.5 HISTORY OF NEPHRECTOMY: Status: ACTIVE | Noted: 2019-10-04

## 2019-10-04 LAB
BASOPHILS # BLD AUTO: 0 X10E3/UL (ref 0–0.2)
BASOPHILS NFR BLD AUTO: 0 %
EOSINOPHIL # BLD AUTO: 0 X10E3/UL (ref 0–0.4)
EOSINOPHIL NFR BLD AUTO: 0 %
ERYTHROCYTE [DISTWIDTH] IN BLOOD BY AUTOMATED COUNT: 12 % (ref 12.3–15.4)
HCT VFR BLD AUTO: 41.1 % (ref 34–46.6)
HGB BLD-MCNC: 13.4 G/DL (ref 11.1–15.9)
IMM GRANULOCYTES # BLD AUTO: 0 X10E3/UL (ref 0–0.1)
IMM GRANULOCYTES NFR BLD AUTO: 0 %
LYMPHOCYTES # BLD AUTO: 1.5 X10E3/UL (ref 0.7–3.1)
LYMPHOCYTES NFR BLD AUTO: 21 %
MCH RBC QN AUTO: 28.5 PG (ref 26.6–33)
MCHC RBC AUTO-ENTMCNC: 32.6 G/DL (ref 31.5–35.7)
MCV RBC AUTO: 87 FL (ref 79–97)
MONOCYTES # BLD AUTO: 0.6 X10E3/UL (ref 0.1–0.9)
MONOCYTES NFR BLD AUTO: 8 %
NEUTROPHILS # BLD AUTO: 4.9 X10E3/UL (ref 1.4–7)
NEUTROPHILS NFR BLD AUTO: 71 %
PLATELET # BLD AUTO: 411 X10E3/UL (ref 150–450)
RBC # BLD AUTO: 4.71 X10E6/UL (ref 3.77–5.28)
WBC # BLD AUTO: 7 X10E3/UL (ref 3.4–10.8)

## 2019-10-04 RX ORDER — ACETAMINOPHEN 500 MG
TABLET ORAL 2 TIMES DAILY
COMMUNITY
End: 2021-07-17

## 2019-10-04 RX ORDER — FAMOTIDINE 20 MG/1
20 TABLET, FILM COATED ORAL 2 TIMES DAILY
COMMUNITY
End: 2022-10-06 | Stop reason: ALTCHOICE

## 2019-10-04 NOTE — Clinical Note
10/29/19 Patient: Moris Rapp YOB: 1990 Date of Visit: 10/4/2019 Asuncion Zhong MD 
[address] [address] VIA In Basket Dear Asuncion Zhong MD, Thank you for referring Ms. Rickey Read to [address] [place] [address],[address] for evaluation. My notes for this consultation are attached. If you have questions, please do not hesitate to call me. I look forward to following your patient along with you. Sincerely, Krishan Mishra MD

## 2019-10-04 NOTE — PROGRESS NOTES
[address] [place] [address], MD, Ann Aguilera MD Opal Gemma, PA-C Delphine Clare, [facility]     Natalia Weinstein, [facility]   JOSE ANTONIO Mcintyre, [facility]       [facility]    at [address], [address] [place] [address], [facility] 22.    [phone]    FAX: [address] [place] [address], [address]    [phone]    FAX: [phone]       Patient Care Team:  Yuriy Ramirez MD as PCP - General (Internal Medicine)  Isabela Rivers MD (Hematology and Oncology)      Problem List  Date Reviewed: 10/4/2019          Codes Class Noted    Liver masses ICD-10-CM: R16.0  ICD-9-CM: 573.8  10/4/2019        Wilm's tumor (nephroblastoma) ([facility] ICD-10-CM: C64.9  ICD-9-CM: 189.0  10/4/2019    Overview Signed 10/4/2019 11:01 AM by Henok Angelo MD     Age 1 years. History of nephrectomy ICD-10-CM: Z90.5  ICD-9-CM: V45.73  10/4/2019        Anxiety ICD-10-CM: F41.9  ICD-9-CM: 300.00  10/4/2019        Autism ICD-10-CM: F84.0  ICD-9-CM: 299.00  5/8/2019        Thrombocytosis ([facility] ICD-10-CM: D47.3  ICD-9-CM: 238.71  5/8/2019                The clinicians listed above have asked me to see Beti Candelario in consultation regarding a liver mass. All medical records sent by the referring physicians were reviewed including imaging studies     The patient is a 26 y.o.  female without any history of previous liver disease. The patient underwent an ultrasound and MRI for evaluation of non-specific abdominal pain   in 5 and 6/2019.       This demonstrated a mass in the left lobe, segment 8 measuring 2.0 x 1.3 cm which appers to be hemangioma and a mass in the left lobe, segment 2 measuring 3.5 x 2.2 cm which may be an adenoma. The patient had been taking estrogen and/or progesterone for birth control since 2005. The patient had Wilms tumor that was resected at age 2 years. The patient has no symptoms which can be attributed to the liver disorder. The patient has not experienced the following symptoms:  fatigue, pain in the right side over the liver,     The patient completes all daily activities without any functional limitations. ASSESSMENT AND PLAN:  Liver mass   There are 2 liver masses. The mass in the right lobe segment 8 may be hemangioma. The mas in the left lobe segment 2 may be an adenoma. Hemangiomas are benign and in general do not increase in size over time. These lesions are not responsive to estrogens or progesterones. HRT or OCH do not need to be stopped if the patient is taking these medications or can be started. Hepatic adenoma is a pre-malignant lesion. The risk of malignancy is increased only if the adenoma is greater than 5 cm. Adenomas are sensitive to estrogens and progestins. HRT or OCH should not be given to the patient or stopped if the patient is already taking these medications. An alternative form of contraception should be utilized. The patent was told to she her GYN and convert [place] to an IUD or some other form of birth control. Stopping hormonal therapy will lead to the lesion shrinking in size. If the lesion reduces in size to less than 4 cm it can safely be monitored and does not need to be surgically resected. The adenoma is 3.5 cm in diameter,       The patient is asymptomatic     Will perform laboratory testing to monitor liver function and degree of liver injury. This included BMP, hepatic panel, CBC with platelet count, INR. Will measure the following serologic cancer markers AFP, CA 19-9, CEA. Serologic testing for causes of chronic liver disease were ordered.       Will perform imaging of the liver with dynamic MRI in 6 months. MRI and MRCP. Treatment of other medical problems in patients with chronic liver disease  There are no contraindications for the patient to take most medications that are necessary for treatment of other medical issues. Counseling for alcohol in patients with chronic liver disease  The patient was counseled regarding alcohol consumption and the effect of alcohol on chronic liver disease. Vaccinations   Since the patient does not have a chronic liver disease which can lead to liver injury screening for HAV and HBV is not needed. Routine vaccinations against other bacterial and viral agents can be performed as indicated. Annual flu vaccination should be administered if indicated. ALLERGIES  Allergies   Allergen Reactions    Fluoxetine Other (comments)     depression      Penicillins Other (comments)     Patient is not allergic to Penicillin       MEDICATIONS  Current Outpatient Medications   Medication Sig    cholecalciferol (VITAMIN D3) 2,000 unit cap capsule Take  by mouth two (2) times a day.  Cetirizine 10 mg cap Take  by mouth.  famotidine (PEPCID AC) 20 mg tablet Take 20 mg by mouth two (2) times a day.  clonazePAM (KLONOPIN) 0.5 mg tablet Take 1 Tab by mouth as needed for Other (anxiety). Max Daily Amount: 48 mg.  levonorgestrel-ethinyl estradiol (SEASONALE CONTRACEPTIVE) 0.15-30 mg-mcg per tablet Take 1 Tab by mouth daily. Indications: PREGNANCY CONTRACEPTION    betamethasone valerate (VALISONE) 0.1 % ointment Apply  to affected area daily. (Patient not taking: Reported on 10/4/2019)    predniSONE (DELTASONE) 5 mg tablet Take 1 Tab by mouth two (2) times a day. (Patient not taking: Reported on 10/4/2019)     No current facility-administered medications for this visit. SYSTEM REVIEW NOT RELATED TO LIVER DISEASE OR REVIEWED ABOVE:  Constitution systems: Negative for fever, chills, weight gain, weight loss. Eyes: Negative for visual changes.   ENT: Negative for sore throat, painful swallowing. Respiratory: Negative for cough, hemoptysis, SOB. Cardiology: Negative for chest pain, palpitations. GI:  Negative for constipation or diarrhea. : Negative for urinary frequency, dysuria, hematuria, nocturia. Skin: Negative for rash. Hematology: Negative for easy bruising, blood clots. Musculo-skelatal: Negative for back pain, muscle pain, weakness. Neurologic: Negative for headaches, dizziness, vertigo, memory problems not related to HE. Psychology: Negative for anxiety, depression. FAMILY HISTORY:  The patient is adopted and does not know any of his family history. SOCIAL HISTORY:  The patient has never been . The patient has no children. The patient has never used tobacco products. The patient has never consumed significant amounts of alcohol. The patient currently works part time as document preparation . 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 PHYSICAL EXAMINATION:  Visit Vitals  /69 (BP 1 Location: Right arm, BP Patient Position: Sitting)   Pulse 83   Temp 98.5 °F (36.9 °C) (Tympanic)   Resp 18   Ht 5' 4\" (1.626 m)   Wt 147 lb 6.4 oz (66.9 kg)   SpO2 97%   BMI 25.30 kg/m²     General: No acute distress. Eyes: Sclera anicteric. ENT: No oral lesions. Thyroid normal.  Nodes: No adenopathy. Skin: No spider angiomata. No jaundice. No palmar erythema. Respiratory: Lungs clear to auscultation. Cardiovascular: Regular heart rate. No murmurs. No JVD. Abdomen: Soft non-tender. Liver size normal to percussion/palpation. Spleen not palpable. No obvious ascites. Extremities: No edema. No muscle wasting. No gross arthritic changes. Neurologic: Alert and oriented. Cranial nerves grossly intact. No asterixis.     LABORATORY STUDIES:  Liver [place] of [address] [place] [address] & Units 10/4/2019   WBC 3.4 - 10.8 x10E3/uL 7.0   ANC 1.4 - 7.0 x10E3/uL 4.9   HGB 11.1 - 15.9 g/dL 13.4    - 450 x10E3/uL 411   INR 0.8 - 1.2 1.0   AST 0 - 40 IU/L 17   ALT 0 - 32 IU/L 23   Alk Phos 39 - 117 IU/L 74   Bili, Total 0.0 - 1.2 mg/dL 0.3   Bili, Direct 0.00 - 0.40 mg/dL 0.09   Albumin 3.5 - 5.5 g/dL 4.2   BUN 6 - 20 mg/dL 16   Creat 0.57 - 1.00 mg/dL 0.90   Na 134 - 144 mmol/L 140   K 3.5 - 5.2 mmol/L 4.4   Cl 96 - 106 mmol/L 102   CO2 20 - 29 mmol/L 21   Glucose 65 - 99 mg/dL 85     Cancer Screening Latest Ref Rng & Units 10/4/2019   AFP, Serum 0.0 - 8.0 ng/mL 0.9     SEROLOGIES:  Serologies Latest Ref Rng & Units 10/4/2019 5/13/2019   Hep A Ab, Total Negative Negative    Hep B Surface Ag Negative Negative    Hep B Core Ab, Total Negative Negative    Hep B Surface AB QL  Non Reactive    Hep C Ab 0.0 - 0.9 s/co ratio <0.1    Ferritin 15 - 150 ng/mL  69   Iron % Saturation 15 - 55 %  31     LIVER HISTOLOGY:  Not available or performed    ENDOSCOPIC PROCEDURES:  Not available or performed    RADIOLOGY:  5/2019. Ultrasound of liver. Normal appearing liver. Single hyperechoic mass in the right lobe measuring 3.6 x 2.9 cm. No dilated bile ducts. 6/2019. Dynamic MRI of liver. Normal appearing liver. Peripheral nodular enhancement with delayed enhancement on washout in the right lobe measuring 2.0 x 1.3 cm. Hypoenhancing mass measuring 3.5 x 2.9 cm in left lobe, segment 2. No central scar. OTHER TESTING:  Not available or performed    FOLLOW-UP:  All of the issues listed above in the Assessment and Plan were discussed with the patient. All questions were answered. The patient expressed a clear understanding of the above. [facility] 87 in 6 months which should be 1-2 weeks after the next imaging study.       MD Jeremy ValenciaW[facility] 13 of [address], [address] [place] [address].  [phone]  [address] [facility]

## 2019-10-04 NOTE — PROGRESS NOTES
Dolores Fitzgerald is a 26 y.o. female    Chief Complaint   Patient presents with    Abnormal liver tests     Referred by Dr. Marcus Salas (Cancer Specialist) for MRI results showing multiple lesions on her liver     1. Have you been to the ER, urgent care clinic since your last visit? Hospitalized since your last visit? No    2. Have you seen or consulted any other health care providers outside of the [address] [place] [address] since your last visit? Include any pap smears or colon screening.  No       Visit Vitals  /69 (BP 1 Location: Right arm, BP Patient Position: Sitting)   Pulse 83   Temp 98.5 °F (36.9 °C) (Tympanic)   Resp 18   Ht 5' 4\" (1.626 m)   Wt 147 lb 6.4 oz (66.9 kg)   SpO2 97%   BMI 25.30 kg/m²

## 2019-10-05 LAB
ALBUMIN SERPL-MCNC: 4.2 G/DL (ref 3.5–5.5)
ALP SERPL-CCNC: 74 IU/L (ref 39–117)
ALT SERPL-CCNC: 23 IU/L (ref 0–32)
AST SERPL-CCNC: 17 IU/L (ref 0–40)
BILIRUB DIRECT SERPL-MCNC: 0.09 MG/DL (ref 0–0.4)
BILIRUB SERPL-MCNC: 0.3 MG/DL (ref 0–1.2)
BUN SERPL-MCNC: 16 MG/DL (ref 6–20)
BUN/CREAT SERPL: 18 (ref 9–23)
CALCIUM SERPL-MCNC: 9.5 MG/DL (ref 8.7–10.2)
CHLORIDE SERPL-SCNC: 102 MMOL/L (ref 96–106)
CO2 SERPL-SCNC: 21 MMOL/L (ref 20–29)
COMMENT, 144067: NORMAL
CREAT SERPL-MCNC: 0.9 MG/DL (ref 0.57–1)
GLUCOSE SERPL-MCNC: 85 MG/DL (ref 65–99)
HAV AB SER QL IA: NEGATIVE
HBV CORE AB SERPL QL IA: NEGATIVE
HBV SURFACE AB SER QL: NON REACTIVE
HBV SURFACE AG SERPL QL IA: NEGATIVE
HCV AB S/CO SERPL IA: <0.1 S/CO RATIO (ref 0–0.9)
INR PPP: 1 (ref 0.8–1.2)
POTASSIUM SERPL-SCNC: 4.4 MMOL/L (ref 3.5–5.2)
PROT SERPL-MCNC: 7.1 G/DL (ref 6–8.5)
PROTHROMBIN TIME: 10.2 SEC (ref 9.1–12)
SODIUM SERPL-SCNC: 140 MMOL/L (ref 134–144)

## 2019-10-08 LAB
AFP L3 MFR SERPL: NORMAL % (ref 0–9.9)
AFP SERPL-MCNC: 0.9 NG/ML (ref 0–8)

## 2020-06-09 ENCOUNTER — VIRTUAL VISIT (OUTPATIENT)
Dept: INTERNAL MEDICINE CLINIC | Age: 30
End: 2020-06-09

## 2020-06-09 DIAGNOSIS — F84.0 AUTISM SPECTRUM DISORDER: ICD-10-CM

## 2020-06-09 DIAGNOSIS — F41.1 GAD (GENERALIZED ANXIETY DISORDER): ICD-10-CM

## 2020-06-09 DIAGNOSIS — C64.9 NEPHROBLASTOMA, UNSPECIFIED LATERALITY (HCC): ICD-10-CM

## 2020-06-09 DIAGNOSIS — R79.89 ELEVATED PLATELET COUNT: Primary | ICD-10-CM

## 2020-06-09 RX ORDER — TETRACYCLINE HCL 500 MG
450 CAPSULE ORAL 2 TIMES DAILY
COMMUNITY
End: 2021-07-17

## 2020-06-09 RX ORDER — LEVONORGESTREL 19.5 MG/1
INTRAUTERINE DEVICE INTRAUTERINE
COMMUNITY
Start: 2020-02-03

## 2020-06-09 NOTE — PROGRESS NOTES
Hoang Prieto is a 26 y.o. female who was seen by synchronous (real-time) audio-video technology on 6/9/2020. Consent: Hoagn Prieto, who was seen by synchronous (real-time) audio-video technology, and/or her healthcare decision maker, is aware that this patient-initiated, Telehealth encounter on 6/9/2020 is a billable service, with coverage as determined by her insurance carrier. She is aware that she may receive a bill and has provided verbal consent to proceed: Yes. Assessment & Plan:   Diagnoses and all orders for this visit:    1. Elevated platelet count    2. NURY (generalized anxiety disorder)    3. Nephroblastoma, unspecified laterality ([facility]    4. Autism spectrum disorder          Subjective:   Hoang Prieto is a 26 y.o. 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 Had a nephrectomy when she was 2years old. Patient was also noted to have two lesion that appeared on her kidney. IS due to have an MRI in August to see size and determine next steps. Saw Rafael Neville earlier this year. Sees Dr. Lalo Flores for abnormal platelet count. Is following peripherally. Patient would like to return back to work, but is concerned with COVID. Reports that she works in close quarters at work. Will allow her a few more weeks and reassess at that time     Denies CP, SOB, fever, rash    Prior to Admission medications    Medication Sig Start Date End Date Taking? Authorizing Provider   levonorgestreL [place]) 17.5 mcg/24 hrs (5 yrs) 19.5 mg IUD IUD Kyleena 17.5 mcg/24 hrs (5yrs) 19.5mg intrauterine device   Take by intrauterine route. 2/3/20  Yes Provider, Historical   apple cider vinegar 500 mg tab Take 450 mg by mouth two (2) times a day. Yes Provider, Historical   clonazePAM (KLONOPIN) 0.5 mg tablet Take 1 Tab by mouth as needed for Other (anxiety).  Max Daily Amount: 48 mg. 4/29/19  Yes Tra Mckeon MD   cholecalciferol (VITAMIN D3) 2,000 unit cap capsule Take  by mouth two (2) times a day. Provider, Historical   Cetirizine 10 mg cap Take  by mouth. Provider, Historical   famotidine (PEPCID AC) 20 mg tablet Take 20 mg by mouth two (2) times a day. Provider, Historical   betamethasone valerate (VALISONE) 0.1 % ointment Apply  to affected area daily. Patient not taking: Reported on 10/4/2019 4/29/19   Tra Mckeon MD   predniSONE (DELTASONE) 5 mg tablet Take 1 Tab by mouth two (2) times a day. Patient not taking: Reported on 10/4/2019 4/29/19   Tra Mckeon MD   levonorgestrel-ethinyl estradiol (SEASONALE CONTRACEPTIVE) 0.15-30 mg-mcg per tablet Take 1 Tab by mouth daily. Indications: PREGNANCY CONTRACEPTION    Other, MD Mel     Allergies   Allergen Reactions    Fluoxetine Other (comments)     depression      Penicillins Other (comments)     Patient is not allergic to Penicillin       Patient Active Problem List   Diagnosis Code    Autism F84.0    Thrombocytosis (HCC) D47.3    Liver masses R16.0    Wilm's tumor (nephroblastoma) ([facility] C64.9    History of nephrectomy Z90.5    Anxiety F41.9     Patient Active Problem List    Diagnosis Date Noted    Liver masses 10/04/2019    Wilm's tumor (nephroblastoma) ([facility] 10/04/2019    History of nephrectomy 10/04/2019    Anxiety 10/04/2019    Autism 05/08/2019    Thrombocytosis ([facility] 05/08/2019     Current Outpatient Medications   Medication Sig Dispense Refill    levonorgestreL (Kyleena) 17.5 mcg/24 hrs (5 yrs) 19.5 mg IUD IUD Kyleena 17.5 mcg/24 hrs (5yrs) 19.5mg intrauterine device   Take by intrauterine route.  apple cider vinegar 500 mg tab Take 450 mg by mouth two (2) times a day.  clonazePAM (KLONOPIN) 0.5 mg tablet Take 1 Tab by mouth as needed for Other (anxiety). Max Daily Amount: 48 mg. 30 Tab 2    cholecalciferol (VITAMIN D3) 2,000 unit cap capsule Take  by mouth two (2) times a day.  Cetirizine 10 mg cap Take  by mouth.  famotidine (PEPCID AC) 20 mg tablet Take 20 mg by mouth two (2) times a day.  betamethasone valerate (VALISONE) 0.1 % ointment Apply  to affected area daily. (Patient not taking: Reported on 10/4/2019) 15 g 0    predniSONE (DELTASONE) 5 mg tablet Take 1 Tab by mouth two (2) times a day. (Patient not taking: Reported on 10/4/2019) 14 Tab 0    levonorgestrel-ethinyl estradiol (SEASONALE CONTRACEPTIVE) 0.15-30 mg-mcg per tablet Take 1 Tab by mouth daily. Indications: PREGNANCY CONTRACEPTION       Allergies   Allergen Reactions    Fluoxetine Other (comments)     depression      Penicillins Other (comments)     Patient is not allergic to Penicillin     Past Medical History:   Diagnosis Date    Autism disorder     Cancer ([facility]     kidney,wilms tumor    Psychotic disorder ([facility]     NURY    Seizure ([facility]     Seizures ([facility]     ? epilepsy    Wilm's tumor (nephroblastoma) ([facility]      Past Surgical History:   Procedure Laterality Date    HX UROLOGICAL      nephrectomy not sure which side     Family History   Adopted: Yes   Problem Relation Age of Onset    No Known Problems Brother      Social History     Tobacco Use    Smoking status: Never Smoker    Smokeless tobacco: Never Used   Substance Use Topics    Alcohol use: No       ROS    Objective:   Vital Signs: (As obtained by patient/caregiver at home)  There were no vitals taken for this visit.      [INSTRUCTIONS:  \"[x]\" Indicates a positive item  \"[]\" Indicates a negative item  -- DELETE ALL ITEMS NOT EXAMINED]    Constitutional: [x] Appears well-developed and well-nourished [x] No apparent distress      [] Abnormal -     Mental status: [x] Alert and awake  [x] Oriented to person/place/time [x] Able to follow commands    [] Abnormal -     Eyes:   EOM    [x]  Normal    [] Abnormal -   Sclera  [x]  Normal    [] Abnormal -          Discharge [x]  None visible   [] Abnormal -     HENT: [x] Normocephalic, atraumatic [] Abnormal -   [x] Mouth/Throat: Mucous membranes are moist    External Ears [x] Normal  [] Abnormal -    Neck: [x] No visualized mass [] Abnormal -     Pulmonary/Chest: [x] Respiratory effort normal   [x] No visualized signs of difficulty breathing or respiratory distress        [] Abnormal -      Musculoskeletal:   [x] Normal gait with no signs of ataxia         [x] Normal range of motion of neck        [] Abnormal -     Neurological:        [x] No Facial Asymmetry (Cranial nerve 7 motor function) (limited exam due to video visit)          [x] No gaze palsy        [] Abnormal -          Skin:        [x] No significant exanthematous lesions or discoloration noted on facial skin         [] Abnormal -            Psychiatric:       [x] Normal Affect [] Abnormal -        [x] No Hallucinations    Other pertinent observable physical exam findings:-        We discussed the expected course, resolution and complications of the diagnosis(es) in detail. Medication risks, benefits, costs, interactions, and alternatives were discussed as indicated. I advised her to contact the office if her condition worsens, changes or fails to improve as anticipated. She expressed understanding with the diagnosis(es) and plan. Kirk Guardado is a 26 y.o. female who was evaluated by a video visit encounter for concerns as above. Patient identification was verified prior to start of the visit. A caregiver was present when appropriate. Due to this being a TeleHealth encounter (During [place] [address] public health emergency), evaluation of the following organ systems was limited: Vitals/Constitutional/EENT/Resp/CV/GI//MS/Neuro/Skin/Heme-Lymph-Imm.   Pursuant to the emergency declaration under the [facility] waiver authority and the [employer] and [employer] Act, this Virtual  Visit was conducted, with patient's (and/or legal guardian's) consent, to reduce the patient's risk of exposure to COVID-19 and provide necessary medical care. Services were provided through a video synchronous discussion virtually to substitute for in-person clinic visit. Patient and provider were located at their individual homes.       Rainer Leone NP

## 2020-06-09 NOTE — PROGRESS NOTES
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 Have you been to the ER, urgent care clinic since your last visit? Hospitalized since your last visit? no    2. Have you seen or consulted any other health care providers outside of the [address] [place] [address] since your last visit? Include any pap smears or colon screening.   no

## 2020-06-09 NOTE — LETTER
NOTIFICATION RETURN TO WORK / SCHOOL 
 
6/9/2020 2:12 PM 
 
Ms. Juan Guzmán [address] [id number] To Whom It May Concern: 
 
Juan Guzmán is currently under the care of [address]. Patient has several complexed medical conditions that continue to allow her to benefit from keeping distances and remaining quarantined due to be COVID. She will return back to work July 7, 2020 If there are questions or concerns please have the patient contact our office. Sincerely, Micah Weathers NP

## 2020-07-07 ENCOUNTER — VIRTUAL VISIT (OUTPATIENT)
Dept: INTERNAL MEDICINE CLINIC | Age: 30
End: 2020-07-07

## 2020-07-07 DIAGNOSIS — F84.0 AUTISM SPECTRUM DISORDER: ICD-10-CM

## 2020-07-07 DIAGNOSIS — F41.1 GAD (GENERALIZED ANXIETY DISORDER): Primary | ICD-10-CM

## 2020-07-07 DIAGNOSIS — C64.9 NEPHROBLASTOMA, UNSPECIFIED LATERALITY (HCC): ICD-10-CM

## 2020-07-07 NOTE — PROGRESS NOTES
Health Maintenance Due   Topic Date Due    DTaP/Tdap/Td series (1 - Tdap) 07/22/2010    PAP AKA CERVICAL CYTOLOGY  07/22/2010       Chief Complaint   Patient presents with    Letter for School/Work     requesting note for work due to [place] and high risk patient       1. Have you been to the ER, urgent care clinic since your last visit? Hospitalized since your last visit? No    2. Have you seen or consulted any other health care providers outside of the [address] [place] [address] since your last visit? Include any pap smears or colon screening. No    3) Do you have an Advance Directive on file? no    4) Are you interested in receiving information on Advance Directives? NO      Patient is accompanied by self I have received verbal consent from Jenni Keller to discuss any/all medical information while they are present in the room.

## 2020-07-07 NOTE — PROGRESS NOTES
Phyllis Redman is a 26 y.o. female who was seen by synchronous (real-time) audio-video technology on 7/7/2020 for Letter for School/Work (requesting note for work due to [place] and high risk patient)        Assessment & Plan:   Diagnoses and all orders for this visit:    1. NURY (generalized anxiety disorder)    2. Nephroblastoma, unspecified laterality ([address]    3. Autism spectrum disorder          Subjective:     Patient with a history of Wilms tumor. Had a nephrectomy when she was 2years old. Patient was also noted to have two lesion that appeared on her kidney. Is due to have an MRI in August to see size and determine next steps. Saw Samaria Pardo earlier this year. Per patient, father has rescheduled MRI due to fear of COVID     Patient would like to return back to work, but is concerned with COVID. Would like a note to continue to stay out of work. Educated patient on FMLA or a more continuous form of work excuse.     Denies CP, SOB, fever    Prior to Admission medications    Medication Sig Start Date End Date Taking? Authorizing Provider   levonorgestreL Evelina Cheese) 17.5 mcg/24 hrs (5 yrs) 19.5 mg IUD IUD Kyleena 17.5 mcg/24 hrs (5yrs) 19.5mg intrauterine device   Take by intrauterine route. 2/3/20  Yes Provider, Historical   apple cider vinegar 500 mg tab Take 450 mg by mouth two (2) times a day. Yes Provider, Historical   cholecalciferol (VITAMIN D3) 2,000 unit cap capsule Take  by mouth two (2) times a day. Yes Provider, Historical   Cetirizine 10 mg cap Take  by mouth. Yes Provider, Historical   clonazePAM (KLONOPIN) 0.5 mg tablet Take 1 Tab by mouth as needed for Other (anxiety). Max Daily Amount: 48 mg. 4/29/19  Yes Steev Vick MD   betamethasone valerate (VALISONE) 0.1 % ointment Apply  to affected area daily. 4/29/19  Yes Steve Vick MD   famotidine (PEPCID AC) 20 mg tablet Take 20 mg by mouth two (2) times a day.     Provider, Historical   predniSONE (DELTASONE) 5 mg tablet Take 1 Tab by mouth two (2) times a day. Patient not taking: Reported on 10/4/2019 4/29/19 7/7/20  Meeta Hoang MD   levonorgestrel-ethinyl estradiol (SEASONALE CONTRACEPTIVE) 0.15-30 mg-mcg per tablet Take 1 Tab by mouth daily. Indications: PREGNANCY CONTRACEPTION  7/7/20  Other, MD Mel         Review of Systems   Constitutional: Negative for chills and fever. Respiratory: Negative. Cardiovascular: Negative. Gastrointestinal: Negative. Genitourinary: Negative. Musculoskeletal: Negative. Neurological: Negative for dizziness and headaches. Psychiatric/Behavioral: Negative.         Objective:     Patient-Reported Vitals 7/7/2020   Patient-Reported Weight 127lb   Patient-Reported Height 5f4   Patient-Reported Temperature -   Patient-Reported LMP IUD        [INSTRUCTIONS:  \"[x]\" Indicates a positive item  \"[]\" Indicates a negative item  -- DELETE ALL ITEMS NOT EXAMINED]    Constitutional: [x] Appears well-developed and well-nourished [x] No apparent distress      [] Abnormal -     Mental status: [x] Alert and awake  [x] Oriented to person/place/time [x] Able to follow commands    [] Abnormal -     Eyes:   EOM    [x]  Normal    [] Abnormal -   Sclera  [x]  Normal    [] Abnormal -          Discharge [x]  None visible   [] Abnormal -     HENT: [x] Normocephalic, atraumatic  [] Abnormal -   [x] Mouth/Throat: Mucous membranes are moist    External Ears [x] Normal  [] Abnormal -    Neck: [x] No visualized mass [] Abnormal -     Pulmonary/Chest: [x] Respiratory effort normal   [x] No visualized signs of difficulty breathing or respiratory distress        [] Abnormal -      Musculoskeletal:   [x] Normal gait with no signs of ataxia         [x] Normal range of motion of neck        [] Abnormal -     Neurological:        [x] No Facial Asymmetry (Cranial nerve 7 motor function) (limited exam due to video visit)          [x] No gaze palsy        [] Abnormal -          Skin:        [x] No significant exanthematous lesions or discoloration noted on facial skin         [] Abnormal -            Psychiatric:       [x] Normal Affect [] Abnormal -        [x] No Hallucinations    Other pertinent observable physical exam findings:-        We discussed the expected course, resolution and complications of the diagnosis(es) in detail. Medication risks, benefits, costs, interactions, and alternatives were discussed as indicated. I advised her to contact the office if her condition worsens, changes or fails to improve as anticipated. She expressed understanding with the diagnosis(es) and plan. Cristian Peña, who was evaluated through a patient-initiated, synchronous (real-time) audio-video encounter, and/or her healthcare decision maker, is aware that it is a billable service, with coverage as determined by her insurance carrier. She provided verbal consent to proceed: Yes, and patient identification was verified. It was conducted pursuant to the emergency declaration under the [address] [place] [address], [address] [place] [address] authority and the [employer] Act. A caregiver was present when appropriate. Ability to conduct physical exam was limited. I was in the office. The patient was at home.       Tomas Peterson NP

## 2021-06-09 ENCOUNTER — TELEPHONE (OUTPATIENT)
Dept: HEMATOLOGY | Age: 31
End: 2021-06-09

## 2021-06-09 DIAGNOSIS — R16.0 LIVER MASS: Primary | ICD-10-CM

## 2021-06-09 NOTE — TELEPHONE ENCOUNTER
[email or web address] Order placed for Dynamic MRI w/wo IV contrast. Called patient father to make aware he can call scheduling to set-up a date/time and once he have a date of the MRI call our office back to make a follow up appt to review the results--parent (father) verbalize understanding. (KF)

## 2021-06-10 ENCOUNTER — TELEPHONE (OUTPATIENT)
Dept: INTERNAL MEDICINE CLINIC | Age: 31
End: 2021-06-10

## 2021-06-29 ENCOUNTER — HOSPITAL ENCOUNTER (OUTPATIENT)
Dept: MRI IMAGING | Age: 31
Discharge: HOME | End: 2021-06-29
Attending: INTERNAL MEDICINE
Payer: COMMERCIAL

## 2021-06-29 VITALS — WEIGHT: 125 LBS | BODY MASS INDEX: 21.46 KG/M2

## 2021-06-29 DIAGNOSIS — R16.0 LIVER MASS: ICD-10-CM

## 2021-06-29 PROCEDURE — 74183 MRI ABD W/O CNTR FLWD CNTR: CPT

## 2021-06-29 PROCEDURE — 74011250636 HC RX REV CODE- 250/636: Performed by: INTERNAL MEDICINE

## 2021-06-29 PROCEDURE — A9585 GADOBUTROL INJECTION: HCPCS | Performed by: INTERNAL MEDICINE

## 2021-06-29 RX ADMIN — GADOBUTROL 6 ML: 604.72 INJECTION INTRAVENOUS at 08:36

## 2021-07-02 ENCOUNTER — OFFICE VISIT (OUTPATIENT)
Dept: HEMATOLOGY | Age: 31
End: 2021-07-02
Payer: COMMERCIAL

## 2021-07-02 VITALS
WEIGHT: 124.6 LBS | SYSTOLIC BLOOD PRESSURE: 115 MMHG | DIASTOLIC BLOOD PRESSURE: 73 MMHG | TEMPERATURE: 96.9 F | RESPIRATION RATE: 16 BRPM | HEART RATE: 79 BPM | HEIGHT: 64 IN | BODY MASS INDEX: 21.27 KG/M2 | OXYGEN SATURATION: 100 %

## 2021-07-02 DIAGNOSIS — D13.4 HEPATIC ADENOMA: Primary | ICD-10-CM

## 2021-07-02 PROCEDURE — 99214 OFFICE O/P EST MOD 30 MIN: CPT | Performed by: INTERNAL MEDICINE

## 2021-07-02 NOTE — Clinical Note
7/17/2021    Patient: Mihai Medina   YOB: 1990   Date of Visit: 7/2/2021     Mario Fontanez MD  [address] [place] [id number]  [facility]    Dear Mario Fontanez MD,      Thank you for referring Ms. Marylin Zuniga to [address] for evaluation. My notes for this consultation are attached. If you have questions, please do not hesitate to call me. I look forward to following your patient along with you.       Sincerely,    Osborn Dancer, MD

## 2021-07-02 NOTE — PROGRESS NOTES
[facility], Jacki GARZA Ethan Kief, MD Shelah Client, ANASTASIYA Hall, [facility]     Natalia Weinstein, [facility]   Reinaldo Ferreira [facility] [place], [facility]       Cinthya Cantor [address]    at [address], [facility] Warren Meneses  22.    [phone]    FAX: [address] [place] [address], [address]    [phone]    FAX: [phone]       Patient Care Team:  Gerri Khalil MD as PCP - General (Internal Medicine)  Gerri Khalil MD as PCP - [address] [place] [address] Provider  Rod Shields MD (Hematology and Oncology)      Problem List  Date Reviewed: 10/29/2019        Codes Class Noted    Liver masses ICD-10-CM: R16.0  ICD-9-CM: 573.8  10/4/2019        Wilm's tumor (nephroblastoma) ([facility] ICD-10-CM: C64.9  ICD-9-CM: 189.0  10/4/2019    Overview Signed 10/4/2019 11:01 AM by Jersey Haynes MD     Age 1 years. History of nephrectomy ICD-10-CM: Z90.5  ICD-9-CM: V45.73  10/4/2019        Anxiety ICD-10-CM: F41.9  ICD-9-CM: 300.00  10/4/2019        Autism ICD-10-CM: F84.0  ICD-9-CM: 299.00  5/8/2019        Thrombocytosis ([facility] ICD-10-CM: D47.3  ICD-9-CM: 238.71  5/8/2019              Tash Butler is being seen at [place] [address] for management of a liver mass. The active problem list, all pertinent past medical history, medications, radiologic findings and laboratory findings related to the liver disorder were reviewed and discussed with the patient. The patient is a 31 y.o.  female without any history of previous liver disease. The patient underwent an ultrasound and MRI for evaluation of non-specific abdominal pain   in 5 and 6/2019.       This demonstrated a mass in the right lobe, segment 8 measuring 2.0 x 1.3 cm which appers to be hemangioma and a mass in the left lobe, segment 2 measuring 3.5 x 2.2 cm which may be an adenoma. The patient had been taking estrogen and/or progesterone for birth control since 2005. [place] were stopped in 6/2019. REpeat MRI was performed in 6/2021. The results demonstrate the hemangioma in the right lobe. The suspected adenoma in the left lobe has resolved and is no longer visible in the left lobe. The patient had Wilms tumor that was resected at age 2 years. The patient has no symptoms which can be attributed to the liver disorder. The patient has not experienced the following symptoms:  fatigue, pain in the right side over the liver,     The patient completes all daily activities without any functional limitations. ASSESSMENT AND PLAN:  Liver hemagioma   This is located in right lobe, segment 8   In 6/2019 this measured 2.0 x 1.3 cm. In 6/2021 this measured 2.5 x 1.5 cm and looks like it could be enlarging. Hemangiomas are benign and in general do not increase in size over time. Howeer, some lesions can slowly increase in size. These lesions are not responsive to estrogens or progesterones. HRT or OCH do not need to be stopped if the patient is taking these medications or can be started. Will repeat the MRI again in 6 months to see if the lesion is still enlarging. Hepatic adenoma  The hepatic adenoma in the left lobe measured 3.5 x 2.2 cm. This resolved and is no longer visible on MRI performed in 6/2021 1 year after [place] were stopped. Hepatic adenoma is a pre-malignant lesion. The risk of malignancy is increased only if the adenoma is greater than 5 cm. Adenomas are sensitive to estrogens and progestins and can reduce in size if HRT or OCH is withdrawn. The patent should not take [place] in the future. She can use IUD for contraception. Will repeat the MRI one final time in 6 months. If no the adenoma is no longer present no further imaging is required. Treatment of other medical problems in patients with chronic liver disease  There are no contraindications for the patient to take most medications that are necessary for treatment of other medical issues. Counseling for alcohol in patients with chronic liver disease  The patient was counseled regarding alcohol consumption and the effect of alcohol on chronic liver disease. Vaccinations   Since the patient does not have a chronic liver disease which can lead to liver injury screening for HAV and HBV is not needed. Routine vaccinations against other bacterial and viral agents can be performed as indicated. Annual flu vaccination should be administered if indicated. ALLERGIES  Allergies   Allergen Reactions    Fluoxetine Other (comments)     depression      Penicillins Other (comments)     Patient is not allergic to Penicillin       MEDICATIONS  Current Outpatient Medications   Medication Sig    levonorgestreL (Kyleena) 17.5 mcg/24 hrs (5 yrs) 19.5 mg IUD IUD Kyleena 17.5 mcg/24 hrs (5yrs) 19.5mg intrauterine device   Take by intrauterine route.  famotidine (PEPCID AC) 20 mg tablet Take 20 mg by mouth two (2) times a day.  clonazePAM (KLONOPIN) 0.5 mg tablet Take 1 Tab by mouth as needed for Other (anxiety). Max Daily Amount: 48 mg.  apple cider vinegar 500 mg tab Take 450 mg by mouth two (2) times a day. (Patient not taking: Reported on 7/2/2021)    cholecalciferol (VITAMIN D3) 2,000 unit cap capsule Take  by mouth two (2) times a day. (Patient not taking: Reported on 7/2/2021)    Cetirizine 10 mg cap Take  by mouth. (Patient not taking: Reported on 7/2/2021)    betamethasone valerate (VALISONE) 0.1 % ointment Apply  to affected area daily. (Patient not taking: Reported on 7/2/2021)     No current facility-administered medications for this visit.        SYSTEM REVIEW NOT RELATED TO LIVER DISEASE OR REVIEWED ABOVE:  Constitution systems: Negative for fever, chills, weight gain, weight loss. Eyes: Negative for visual changes. ENT: Negative for sore throat, painful swallowing. Respiratory: Negative for cough, hemoptysis, SOB. Cardiology: Negative for chest pain, palpitations. GI:  Negative for constipation or diarrhea. : Negative for urinary frequency, dysuria, hematuria, nocturia. Skin: Negative for rash. Hematology: Negative for easy bruising, blood clots. Musculo-skelatal: Negative for back pain, muscle pain, weakness. Neurologic: Negative for headaches, dizziness, vertigo, memory problems not related to HE. Psychology: Negative for anxiety, depression. FAMILY HISTORY:  The patient is adopted and does not know any of his family history. SOCIAL HISTORY:  The patient has never been . The patient has no children. The patient has never used tobacco products. The patient has never consumed significant amounts of alcohol. The patient currently works part time as document preparation . 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 PHYSICAL EXAMINATION:  Visit Vitals  /73 (BP 1 Location: Right upper arm, BP Patient Position: Sitting, BP Cuff Size: Adult)   Pulse 79   Temp 96.9 °F (36.1 °C) (Temporal)   Resp 16   Ht 5' 4\" (1.626 m)   Wt 124 lb 9.6 oz (56.5 kg)   SpO2 100%   BMI 21.39 kg/m²     General: No acute distress. Eyes: Sclera anicteric. ENT: No oral lesions. Thyroid normal.  Nodes: No adenopathy. Skin: No spider angiomata. No jaundice. No palmar erythema. Respiratory: Lungs clear to auscultation. Cardiovascular: Regular heart rate. No murmurs. No JVD. Abdomen: Soft non-tender. Liver size normal to percussion/palpation. Spleen not palpable. No obvious ascites. Extremities: No edema. No muscle wasting. No gross arthritic changes. Neurologic: Alert and oriented. Cranial nerves grossly intact. No asterixis.     LABORATORY STUDIES:  Liver Alana Crockett 1420 Units 10/4/2019   WBC 3.4 - 10.8 x10E3/uL 7.0   ANC 1.4 - 7.0 x10E3/uL 4.9   HGB 11.1 - 15.9 g/dL 13.4    - 450 x10E3/uL 411   INR 0.8 - 1.2 1.0   AST 0 - 40 IU/L 17   ALT 0 - 32 IU/L 23   Alk Phos 39 - 117 IU/L 74   Bili, Total 0.0 - 1.2 mg/dL 0.3   Bili, Direct 0.00 - 0.40 mg/dL 0.09   Albumin 3.5 - 5.5 g/dL 4.2   BUN 6 - 20 mg/dL 16   Creat 0.57 - 1.00 mg/dL 0.90   Na 134 - 144 mmol/L 140   K 3.5 - 5.2 mmol/L 4.4   Cl 96 - 106 mmol/L 102   CO2 20 - 29 mmol/L 21   Glucose 65 - 99 mg/dL 85     Cancer Screening Latest Ref Rng & Units 10/4/2019   AFP, Serum 0.0 - 8.0 ng/mL 0.9     SEROLOGIES:  Serologies Latest Ref Rng & Units 10/4/2019 5/13/2019   Hep A Ab, Total Negative Negative    Hep B Surface Ag Negative Negative    Hep B Core Ab, Total Negative Negative    Hep B Surface AB QL  Non Reactive    Hep C Ab 0.0 - 0.9 s/co ratio <0.1    Ferritin 15 - 150 ng/mL  69   Iron % Saturation 15 - 55 %  31     LIVER HISTOLOGY:  Not available or performed    ENDOSCOPIC PROCEDURES:  Not available or performed    RADIOLOGY:  5/2019. Ultrasound of liver. Normal appearing liver. Single hyperechoic mass in the right lobe measuring 3.6 x 2.9 cm. No dilated bile ducts. 6/2019. Dynamic MRI of liver. Normal appearing liver. Peripheral nodular enhancement with delayed enhancement on washout in the right lobe measuring 2.0 x 1.3 cm. Hypoenhancing mass measuring 3.5 x 2.9 cm in left lobe, segment 2. No central scar. OTHER TESTING:  Not available or performed    FOLLOW-UP:  All of the issues listed above in the Assessment and Plan were discussed with the patient. All questions were answered. The patient expressed a clear understanding of the above. [facility] in 6 months which should be 1-2 weeks after the next imaging study.       MD Jeremy Bowman 13 of [address] [place] [address] Swathi Swain, [address] [place] [address], [place] [phone]  [address] [place] [address]

## 2021-07-02 NOTE — PROGRESS NOTES
Identified pt with two pt identifiers(name and ). Reviewed record in preparation for visit and have obtained necessary documentation. Chief Complaint   Patient presents with    Other       Liver mass  f/u      Vitals:    21 1256   BP: 115/73   Pulse: 79   Resp: 16   Temp: 96.9 °F (36.1 °C)   TempSrc: Temporal   SpO2: 100%   Weight: 124 lb 9.6 oz (56.5 kg)   Height: 5' 4\" (1.626 m)   PainSc:   0 - No pain       Health Maintenance Review: Patient reminded of \"due or due soon\" health maintenance. I have asked the patient to contact his/her primary care provider (PCP) for follow-up on his/her health maintenance. Coordination of Care Questionnaire:  :   1) Have you been to an emergency room, urgent care, or hospitalized since your last visit? If yes, where when, and reason for visit? no       2. Have seen or consulted any other health care provider since your last visit? If yes, where when, and reason for visit? NO      Patient is accompanied by brother have received verbal consent from Yung Green to discuss any/all medical information while they are present in the room.

## 2021-07-09 ENCOUNTER — TRANSCRIBE ORDER (OUTPATIENT)
Dept: INTERNAL MEDICINE CLINIC | Age: 31
End: 2021-07-09

## 2021-10-07 ENCOUNTER — OFFICE VISIT (OUTPATIENT)
Dept: INTERNAL MEDICINE CLINIC | Age: 31
End: 2021-10-07
Payer: COMMERCIAL

## 2021-10-07 VITALS
HEIGHT: 64 IN | BODY MASS INDEX: 21.21 KG/M2 | WEIGHT: 124.2 LBS | DIASTOLIC BLOOD PRESSURE: 72 MMHG | TEMPERATURE: 98.6 F | HEART RATE: 104 BPM | SYSTOLIC BLOOD PRESSURE: 120 MMHG | OXYGEN SATURATION: 97 % | RESPIRATION RATE: 16 BRPM

## 2021-10-07 DIAGNOSIS — F41.1 GAD (GENERALIZED ANXIETY DISORDER): ICD-10-CM

## 2021-10-07 DIAGNOSIS — Z23 ENCOUNTER FOR IMMUNIZATION: ICD-10-CM

## 2021-10-07 DIAGNOSIS — D13.4 ADENOMA OF LIVER: ICD-10-CM

## 2021-10-07 DIAGNOSIS — F84.0 AUTISM SPECTRUM DISORDER: ICD-10-CM

## 2021-10-07 DIAGNOSIS — E55.9 VITAMIN D DEFICIENCY: ICD-10-CM

## 2021-10-07 DIAGNOSIS — Z00.00 ROUTINE GENERAL MEDICAL EXAMINATION AT A HEALTH CARE FACILITY: Primary | ICD-10-CM

## 2021-10-07 PROCEDURE — 99395 PREV VISIT EST AGE 18-39: CPT | Performed by: INTERNAL MEDICINE

## 2021-10-07 PROCEDURE — 90686 IIV4 VACC NO PRSV 0.5 ML IM: CPT | Performed by: INTERNAL MEDICINE

## 2021-10-07 NOTE — PROGRESS NOTES
HISTORY OF PRESENT ILLNESS  Valentín Nieves is a 27 y.o. female here for complete physical.  She has autism. Working full-time. Staying in her own house. She suffers from anxiety, using Klonopin as needed. She had a liver hemangioma and adenoma. Following up with clear hepatologist.  Adenoma has shrunk. Doing well otherwise. Need blood work. Need flu shot. HPI    Review of Systems   Constitutional: Negative. HENT: Negative. Eyes: Negative. Respiratory: Negative. Cardiovascular: Negative. Gastrointestinal: Negative. Genitourinary: Negative. Musculoskeletal: Negative. Skin: Negative. Neurological: Negative. Endo/Heme/Allergies: Negative. Psychiatric/Behavioral: The patient is nervous/anxious. Physical Exam  Constitutional:       Appearance: Normal appearance. She is normal weight. HENT:      Head: Normocephalic and atraumatic. Right Ear: Tympanic membrane normal.      Left Ear: Tympanic membrane normal.      Nose: Nose normal.   Eyes:      Extraocular Movements: Extraocular movements intact. Pupils: Pupils are equal, round, and reactive to light. Cardiovascular:      Rate and Rhythm: Normal rate and regular rhythm. Pulses: Normal pulses. Heart sounds: Normal heart sounds. Pulmonary:      Effort: Pulmonary effort is normal.      Breath sounds: Normal breath sounds. Musculoskeletal:         General: Normal range of motion. Cervical back: Normal range of motion and neck supple. Skin:     General: Skin is warm. Neurological:      General: No focal deficit present. Mental Status: She is alert and oriented to person, place, and time. Mental status is at baseline. Psychiatric:         Mood and Affect: Mood normal.         Behavior: Behavior normal.         Thought Content: Thought content normal.      Comments: She is autistic. Has anxiety. ASSESSMENT and PLAN  Diagnoses and all orders for this visit:    1.  Routine general medical examination at a health care facility    Seems healthy. Advised to eat healthy and exercise. Will check,  -     CBC WITH AUTOMATED DIFF  -     METABOLIC PANEL, COMPREHENSIVE  -     LIPID PANEL  -     TSH 3RD GENERATION  -     URINALYSIS W/ REFLEX CULTURE    2. NURY (generalized anxiety disorder)  Taking Klonopin as needed. Sometimes profound anxiety cause seizure episode. .  Seeing psychiatrist.  3. Autism spectrum disorder  Staying in her own housing. Working. 4. Adenoma of liver  Adenoiditis. Following up with hepatologist.  Follow-up MRI in 6-month. 5. Vitamin D deficiency  -     VITAMIN D, 25 HYDROXY    6. Encounter for immunization    We will give,  -     INFLUENZA VIRUS VAC QUAD,SPLIT,PRESV FREE SYRINGE IM    Discussed expected course/resolution/complications of diagnosis in detail with patient. Medication risks/benefits/costs/interactions/alternatives discussed with patient. Discussed COVID-19 infection precaution with patient. Pt was given an after visit summary which includes diagnoses, current medications & vitals. Pt expressed understanding with the diagnosis and plan.

## 2021-10-07 NOTE — PROGRESS NOTES
Health Maintenance Due   Topic Date Due    DTaP/Tdap/Td series (1 - Tdap) Never done    Cervical cancer screen  Never done    Flu Vaccine (1) 09/01/2021       Chief Complaint   Patient presents with    Anxiety    Thrombocytosis    Other     f/u       1. Have you been to the ER, urgent care clinic since your last visit? Hospitalized since your last visit? No    2. Have you seen or consulted any other health care providers outside of the [address] [place] [address] since your last visit? Include any pap smears or colon screening. No    3) Do you have an Advance Directive on file? no    4) Are you interested in receiving information on Advance Directives? NO      Patient is accompanied by self I have received verbal consent from Hettie Schlatter to discuss any/all medical information while they are present in the room.

## 2021-10-14 LAB
25(OH)D3+25(OH)D2 SERPL-MCNC: 41.5 NG/ML (ref 30–100)
ALBUMIN SERPL-MCNC: 4.6 G/DL (ref 3.8–4.8)
ALBUMIN/GLOB SERPL: 1.5 {RATIO} (ref 1.2–2.2)
ALP SERPL-CCNC: 82 IU/L (ref 44–121)
ALT SERPL-CCNC: 52 IU/L (ref 0–32)
APPEARANCE UR: CLEAR
AST SERPL-CCNC: 27 IU/L (ref 0–40)
BACTERIA #/AREA URNS HPF: NORMAL /[HPF]
BASOPHILS # BLD AUTO: 0 X10E3/UL (ref 0–0.2)
BASOPHILS NFR BLD AUTO: 0 %
BILIRUB SERPL-MCNC: 0.5 MG/DL (ref 0–1.2)
BILIRUB UR QL STRIP: NEGATIVE
BUN SERPL-MCNC: 13 MG/DL (ref 6–20)
BUN/CREAT SERPL: 15 (ref 9–23)
CALCIUM SERPL-MCNC: 9.6 MG/DL (ref 8.7–10.2)
CASTS URNS QL MICRO: NORMAL /LPF
CHLORIDE SERPL-SCNC: 106 MMOL/L (ref 96–106)
CHOLEST SERPL-MCNC: 139 MG/DL (ref 100–199)
CO2 SERPL-SCNC: 24 MMOL/L (ref 20–29)
COLOR UR: YELLOW
CREAT SERPL-MCNC: 0.84 MG/DL (ref 0.57–1)
EOSINOPHIL # BLD AUTO: 0.1 X10E3/UL (ref 0–0.4)
EOSINOPHIL NFR BLD AUTO: 1 %
EPI CELLS #/AREA URNS HPF: NORMAL /HPF (ref 0–10)
ERYTHROCYTE [DISTWIDTH] IN BLOOD BY AUTOMATED COUNT: 11.6 % (ref 11.7–15.4)
GLOBULIN SER CALC-MCNC: 3.1 G/DL (ref 1.5–4.5)
GLUCOSE SERPL-MCNC: 85 MG/DL (ref 65–99)
GLUCOSE UR QL: NEGATIVE
HCT VFR BLD AUTO: 42.6 % (ref 34–46.6)
HDLC SERPL-MCNC: 49 MG/DL
HGB BLD-MCNC: 14.3 G/DL (ref 11.1–15.9)
HGB UR QL STRIP: ABNORMAL
IMM GRANULOCYTES # BLD AUTO: 0 X10E3/UL (ref 0–0.1)
IMM GRANULOCYTES NFR BLD AUTO: 0 %
IMP & REVIEW OF LAB RESULTS: NORMAL
KETONES UR QL STRIP: NEGATIVE
LDLC SERPL CALC-MCNC: 75 MG/DL (ref 0–99)
LEUKOCYTE ESTERASE UR QL STRIP: NEGATIVE
LYMPHOCYTES # BLD AUTO: 1.4 X10E3/UL (ref 0.7–3.1)
LYMPHOCYTES NFR BLD AUTO: 20 %
MCH RBC QN AUTO: 30.2 PG (ref 26.6–33)
MCHC RBC AUTO-ENTMCNC: 33.6 G/DL (ref 31.5–35.7)
MCV RBC AUTO: 90 FL (ref 79–97)
MICRO URNS: ABNORMAL
MONOCYTES # BLD AUTO: 0.5 X10E3/UL (ref 0.1–0.9)
MONOCYTES NFR BLD AUTO: 8 %
NEUTROPHILS # BLD AUTO: 4.9 X10E3/UL (ref 1.4–7)
NEUTROPHILS NFR BLD AUTO: 71 %
NITRITE UR QL STRIP: NEGATIVE
PH UR STRIP: 5.5 [PH] (ref 5–7.5)
PLATELET # BLD AUTO: 274 X10E3/UL (ref 150–450)
POTASSIUM SERPL-SCNC: 4.7 MMOL/L (ref 3.5–5.2)
PROT SERPL-MCNC: 7.7 G/DL (ref 6–8.5)
PROT UR QL STRIP: NEGATIVE
RBC # BLD AUTO: 4.73 X10E6/UL (ref 3.77–5.28)
RBC #/AREA URNS HPF: NORMAL /HPF (ref 0–2)
SODIUM SERPL-SCNC: 141 MMOL/L (ref 134–144)
SP GR UR: 1.01 (ref 1–1.03)
TRIGL SERPL-MCNC: 79 MG/DL (ref 0–149)
TSH SERPL DL<=0.005 MIU/L-ACNC: 1.36 UIU/ML (ref 0.45–4.5)
URINALYSIS REFLEX, 377202: ABNORMAL
UROBILINOGEN UR STRIP-MCNC: 0.2 MG/DL (ref 0.2–1)
VLDLC SERPL CALC-MCNC: 15 MG/DL (ref 5–40)
WBC # BLD AUTO: 6.9 X10E3/UL (ref 3.4–10.8)
WBC #/AREA URNS HPF: NORMAL /HPF (ref 0–5)

## 2022-03-19 PROBLEM — C64.9 WILM'S TUMOR (NEPHROBLASTOMA) (HCC): Status: ACTIVE | Noted: 2019-10-04

## 2022-03-19 PROBLEM — Z90.5 HISTORY OF NEPHRECTOMY: Status: ACTIVE | Noted: 2019-10-04

## 2022-03-19 PROBLEM — R16.0 LIVER MASSES: Status: ACTIVE | Noted: 2019-10-04

## 2022-03-19 PROBLEM — F41.9 ANXIETY: Status: ACTIVE | Noted: 2019-10-04

## 2022-03-19 PROBLEM — D75.839 THROMBOCYTOSIS: Status: ACTIVE | Noted: 2019-05-08

## 2022-03-19 PROBLEM — F84.0 AUTISM: Status: ACTIVE | Noted: 2019-05-08

## 2022-03-23 ENCOUNTER — VIRTUAL VISIT (OUTPATIENT)
Dept: HEMATOLOGY | Age: 32
End: 2022-03-23
Payer: COMMERCIAL

## 2022-03-23 DIAGNOSIS — D13.4 HEPATIC ADENOMA: Primary | ICD-10-CM

## 2022-03-23 PROCEDURE — 99214 OFFICE O/P EST MOD 30 MIN: CPT | Performed by: PHYSICIAN ASSISTANT

## 2022-03-23 NOTE — PROGRESS NOTES
Identified pt with two pt identifiers(name and ). Reviewed record in preparation for visit and have obtained necessary documentation. Chief Complaint   Patient presents with    Other     [facility]/u with Darshana      There were no vitals filed for this visit. Health Maintenance Review: Patient reminded of \"due or due soon\" health maintenance. I have asked the patient to contact his/her primary care provider (PCP) for follow-up on his/her health maintenance. Coordination of Care Questionnaire:  :   1) Have you been to an emergency room, urgent care, or hospitalized since your last visit? If yes, where when, and reason for visit? no       2. Have seen or consulted any other health care provider since your last visit? If yes, where when, and reason for visit? NO      Patient is accompanied by self I have received verbal consent from Faheem Dunn to discuss any/all medical information while they are present in the room.

## 2022-03-23 NOTE — PROGRESS NOTES
[facility], MD, [address], [facility], [place]      Dillon Andre, ANASTASIYA Pacheco, [facility]     Natalia Weinstein, [facility]   CRISTÓBAL Cramer-ALF Torre, [facility]       Cinthya Cantor Emre [address]    at [address], [address] [address]eses  22.    [phone]    FAX: [address] [place] [address]    at [address], [address]    [phone]    FAX: [phone]       Patient Care Team:  John Sidhu MD as PCP - General (Internal Medicine)  John Sidhu MD as PCP - [facility] Provider  Rhonda Gamez MD (Hematology and Oncology)      Problem List  Date Reviewed: 10/7/2021          Codes Class Noted    Liver masses ICD-10-CM: R16.0  ICD-9-CM: 573.8  10/4/2019        Wilm's tumor (nephroblastoma) ([facility] [address] ICD-10-CM: C64.9  ICD-9-CM: 189.0  10/4/2019    Overview Signed 10/4/2019 11:01 AM by Leveda Snellen, MD     Age 1 years. History of nephrectomy ICD-10-CM: Z90.5  ICD-9-CM: V45.73  10/4/2019        Anxiety ICD-10-CM: F41.9  ICD-9-CM: 300.00  10/4/2019        Autism ICD-10-CM: F84.0  ICD-9-CM: 299.00  5/8/2019        Thrombocytosis ICD-10-CM: X17.745  ICD-9-CM: 238.71  5/8/2019            VIRTUAL TELEHEALTH VISIT PERFORMED DUE TO COVID-19 EPIDEMIC    CONSENT:  [place], who was evaluated through a synchronous (real-time) audio-video encounter, and/or his healthcare decision maker, is aware that it is a billable service, which includes applicable co-pays, with coverage as determined by his insurance carrier. He provided verbal consent to proceed and patient identification was verified.  This visit was conducted pursuant to the emergency declaration under the [facility], [facility] waiver authority and the Coronavirus Preparedness and Response Supplemental Appropriations Act. A caregiver was present when appropriate. Ability to conduct physical exam was limited. The patient was located at home in a state where the provider was licensed to provide care. Brother is involved in her care and on call, Finesse Euceda is being seen at [place] [address] for management of a liver mass. The active problem list, all pertinent past medical history, medications, radiologic findings and laboratory findings related to the liver disorder were reviewed and discussed with the patient. The patient is a 27 y.o.  female without any history of previous liver disease. The patient underwent an ultrasound and MRI for evaluation of non-specific abdominal pain   in 5/2019 and 6/2019. This demonstrated a mass in the right lobe, segment 8 measuring 2.0 x 1.3 cm which appers to be hemangioma and a mass in the left lobe, segment 2 measuring 3.5 x 2.2 cm which appears to have been adenoma. The patient had been taking estrogen and/or progesterone for birth control since 2005. [place] were stopped in 6/2019. Repeat MRI was performed in 6/2021. The results demonstrate the hemangioma in the right lobe. The suspected adenoma in the left lobe has resolved and is no longer visible in the left lobe. The patient had Wilms tumor that was resected at age 2 years. The patient has no symptoms which can be attributed to the liver disorder. The patient has not experienced the following symptoms:  fatigue, pain in the right side over the liver,     The patient completes all daily activities without any functional limitations. Review of her chart shows that liver enzymes were minimally elevated in 10/2021. She reports that she had not started any new medications and had not had any vaccinations at that time.   Her weight has been stable, if anything, she has lost 10-15# in coming off of the OCP. ASSESSMENT AND PLAN:  Liver hemagioma   This is located in right lobe, segment 8   In 6/2019 this measured 2.0 x 1.3 cm. In 6/2021 this measured 2.5 x 1.5 cm and looks like it could be enlarging. Hemangiomas are benign and in general do not increase in size over time. However, some lesions can slowly increase in size. These lesions are not responsive to estrogens or progesterones. HRT or OCH do not need to be stopped if the patient is taking these medications or can be started. Will repeat the MRI again now as ordered to see if the lesion is still enlarging. I will follow-up with her on this finding as available. Hepatic adenoma  The hepatic adenoma in the left lobe measured 3.5 x 2.2 cm. This resolved and is no longer visible on MRI performed in 6/2021 1 year after [facility] were stopped. Hepatic adenoma is a pre-malignant lesion. The risk of malignancy is increased only if the adenoma is greater than 5 cm. Adenomas are sensitive to estrogens and progestins and can reduce in size if HRT or OCH is withdrawn. The patent should not take [facility] in the future. She can use IUD for contraception. As above, will repeat the MRI one final time now. If no the adenoma is no longer present no further imaging is required. Elevated liver enzyme  The etiology of this isolated enzyme in 10/2021 is not clear as she doesn't recall any changes in medications or viral illness at that time. Will send a lab order to obtain one additional time and if this is normal, no additional follow-up required. Treatment of other medical problems in patients with chronic liver disease  There are no contraindications for the patient to take most medications that are necessary for treatment of other medical issues. Counseling for alcohol in patients with chronic liver disease  The patient was counseled regarding alcohol consumption and the effect of alcohol on chronic liver disease.     Vaccinations Since the patient does not have a chronic liver disease which can lead to liver injury screening for HAV and HBV is not needed. Routine vaccinations against other bacterial and viral agents can be performed as indicated. Annual flu vaccination should be administered if indicated. She has had all COVID vaccines and booster in 11/2021. ALLERGIES  Allergies   Allergen Reactions    Penicillins Other (comments)     Patient is not allergic to Penicillin    Prozac [Fluoxetine] Other (comments)     depression         MEDICATIONS  Current Outpatient Medications   Medication Sig    levonorgestreL (Kyleena) 17.5 mcg/24 hrs (5 yrs) 19.5 mg IUD IUD Kyleena 17.5 mcg/24 hrs (5yrs) 19.5mg intrauterine device   Take by intrauterine route.  clonazePAM (KLONOPIN) 0.5 mg tablet Take 1 Tab by mouth as needed for Other (anxiety). Max Daily Amount: 48 mg.    famotidine (PEPCID AC) 20 mg tablet Take 20 mg by mouth two (2) times a day. No current facility-administered medications for this visit. SYSTEM REVIEW NOT RELATED TO LIVER DISEASE OR REVIEWED ABOVE:  Constitution systems: Negative for fever, chills, weight gain, weight loss. Eyes: Negative for visual changes. ENT: Negative for sore throat, painful swallowing. Respiratory: Negative for cough, hemoptysis, SOB. Cardiology: Negative for chest pain, palpitations. GI:  Negative for constipation or diarrhea. : Negative for urinary frequency, dysuria, hematuria, nocturia. Skin: Negative for rash. Hematology: Negative for easy bruising, blood clots. Musculo-skeletal: Negative for back pain, muscle pain, weakness. Neurologic: Negative for headaches, dizziness, vertigo, memory problems not related to HE. Psychology: Negative for anxiety, depression. FAMILY HISTORY:  The patient is adopted and does not know any of his family history. SOCIAL HISTORY:  The patient has never been . The patient has no children.      The patient has never used tobacco products. The patient has never consumed significant amounts of alcohol. The patient currently works part time as document preparation . 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 PHYSICAL EXAMINATION PERFORMED BY VIRTUAL TELEHEALTH:  VS: Not performed   General: No acute distress. Eyes: Sclera anicteric. ENT: No oral lesions. Skin: No rashes. spider angiomata. No jaundice. Abdomen: No obvious distention suggesting ascites. Extremities: No edema. No muscle wasting. Neurologic: Alert and oriented. Cranial nerves grossly intact. LABORATORY STUDIES:  Liver [place] [address] Units 10/13/2021 10/4/2019   WBC 3.4 - 10.8 x10E3/uL 6.9 7.0   ANC 1.4 - 7.0 x10E3/uL 4.9 4.9   HGB 11.1 - 15.9 g/dL 14.3 13.4    - 450 x10E3/uL 274 411   INR 0.8 - 1.2  1.0   AST 0 - 40 IU/L 27 17   ALT 0 - 32 IU/L 52 (H) 23   Alk Phos 44 - 121 IU/L 82 74   Bili, Total 0.0 - 1.2 mg/dL 0.5 0.3   Bili, Direct 0.00 - 0.40 mg/dL  0.09   Albumin 3.8 - 4.8 g/dL 4.6 4.2   BUN 6 - 20 mg/dL 13 16   Creat 0.57 - 1.00 mg/dL 0.84 0.90   Na 134 - 144 mmol/L 141 140   K 3.5 - 5.2 mmol/L 4.7 4.4   Cl 96 - 106 mmol/L 106 102   CO2 20 - 29 mmol/L 24 21   Glucose 65 - 99 mg/dL 85 85     Cancer Screening Latest Ref Rng & Units 10/4/2019   AFP, Serum 0.0 - 8.0 ng/mL 0.9   AFP-L3% 0.0 - 9.9 % Comment     SEROLOGIES:  Serologies Latest Ref Rng & Units 10/4/2019 5/13/2019   Hep A Ab, Total Negative Negative    Hep B Surface Ag Negative Negative    Hep B Core Ab, Total Negative Negative    Hep B Surface AB QL  Non Reactive    Hep C Ab 0.0 - 0.9 s/co ratio <0.1    Ferritin 15 - 150 ng/mL  69   Iron % Saturation 15 - 55 %  31     LIVER HISTOLOGY:  Not available or performed    ENDOSCOPIC PROCEDURES:  Not available or performed    RADIOLOGY:  5/2019. Ultrasound of liver. Normal appearing liver. Single hyperechoic mass in the right lobe measuring 3.6 x 2.9 cm. No dilated bile ducts. 6/2019. Dynamic MRI of liver. Normal appearing liver. Peripheral nodular enhancement with delayed enhancement on washout in the right lobe measuring 2.0 x 1.3 cm. Hypoenhancing mass measuring 3.5 x 2.9 cm in left lobe, segment 2. No central scar. 6/2021. MRI of abdomen. Slight increase in size of the 2.5 cm hemangioma in segment 8. Resolution of left hepatic lobe adenoma and vascular shunt. No new liver lesion. Normal gallbladder and biliary tree. Right nephrectomy. Normal left kidney. OTHER TESTING:  Not available or performed    FOLLOW-UP AFTER VIRTUAL VISIT:  Pursuant to the emergency declaration under the [facility], [facility] waiver authority and the [employer] Act, this Virtual  Visit was conducted, with the patient's (and/or their legal guardian's) consent, to reduce the patient's risk of exposure to COVID-19 and provide necessary medical care. Services were provided through a video synchronous discussion virtually to substitute for an in-person clinic visit. The patient was located in their home. The provider was located in the [employer] office. All of the issues listed above in the Assessment and Plan were discussed with the patient. All questions were answered. The patient expressed a clear understanding of the above. Follow-up Nicolas Gonzalez 32 prn only pending review of upcoming MRI and lab studies. Documentation reviewed and updated to reflect current, accurate patient information.     Allison Tran PA-C  Liver [place] [facility] 59, [facility] 22.  [phone]  [address]

## 2022-04-08 ENCOUNTER — HOSPITAL ENCOUNTER (OUTPATIENT)
Dept: MRI IMAGING | Age: 32
Discharge: HOME | End: 2022-04-08
Attending: PHYSICIAN ASSISTANT
Payer: COMMERCIAL

## 2022-04-08 DIAGNOSIS — D13.4 HEPATIC ADENOMA: ICD-10-CM

## 2022-04-08 PROCEDURE — A9576 INJ PROHANCE MULTIPACK: HCPCS

## 2022-04-08 PROCEDURE — 74183 MRI ABD W/O CNTR FLWD CNTR: CPT

## 2022-04-08 PROCEDURE — 74011000258 HC RX REV CODE- 258: Performed by: PHYSICIAN ASSISTANT

## 2022-04-08 PROCEDURE — 74011250636 HC RX REV CODE- 250/636

## 2022-04-08 PROCEDURE — 74011000250 HC RX REV CODE- 250: Performed by: PHYSICIAN ASSISTANT

## 2022-04-08 RX ORDER — SODIUM CHLORIDE 0.9 % (FLUSH) 0.9 %
10 SYRINGE (ML) INJECTION
Status: COMPLETED | OUTPATIENT
Start: 2022-04-08 | End: 2022-04-08

## 2022-04-08 RX ADMIN — SODIUM CHLORIDE 100 ML: 900 INJECTION, SOLUTION INTRAVENOUS at 16:21

## 2022-04-08 RX ADMIN — GADOTERIDOL 10 ML: 279.3 INJECTION, SOLUTION INTRAVENOUS at 16:20

## 2022-04-08 RX ADMIN — SODIUM CHLORIDE, PRESERVATIVE FREE 10 ML: 5 INJECTION INTRAVENOUS at 16:20

## 2022-04-14 LAB
ALBUMIN SERPL-MCNC: 4.3 G/DL (ref 3.8–4.8)
ALP SERPL-CCNC: 87 IU/L (ref 44–121)
ALT SERPL-CCNC: 32 IU/L (ref 0–32)
AST SERPL-CCNC: 27 IU/L (ref 0–40)
BILIRUB DIRECT SERPL-MCNC: 0.13 MG/DL (ref 0–0.4)
BILIRUB SERPL-MCNC: 0.4 MG/DL (ref 0–1.2)
PROT SERPL-MCNC: 7 G/DL (ref 6–8.5)

## 2022-04-15 NOTE — PROGRESS NOTES
Pt notified with phone and [address] letter of stable findings on imaging and labs. No indication for long-term follow-up. Continue with PCP for routine care.

## 2022-04-18 NOTE — LETTER
EP Consult    Requested Physician: Dr. Arizmendi    Physician Requesting Consult: Ning Bach MD    Reason for Consult: Bradycardia (for opinion regarding further evaluation and management)    History of present illness: Donavon Acosta Jr. is a 88 year old with a history of CAD s/p CABG, TIA, bradycardia, ischemic cardiomyopathy with reduced ejection fraction 46-50%, valvular heart disease status post bioprosthetic AVR and MVR in 2013 and subsequent tMVR 2018, atrial fibrillation on Eliquis prior to admission who presented to [facility] with weakness of his right arm.  CT of the head was negative for acute findings.  CT angiogram of the head and neck showed moderate right ICA.  MRI 4/5/22 showed multifocal punctate ischemic infarcts in the left middle frontal gyrus, precentral gyrus, occipital lobe and cerebellum which was suggestive of cardioembolic event.  Neurology consulted.  Patient underwent ANKITA which showed a mobile left atrial appendage thrombus.  EF noted to be 50% and aortic valve area noted to be 0.9 cm2.  Patient continued to recover from his acute stroke and was recommended for inpatient rehab.  Patient was transferred to [facility] for inpatient rehab on April 8th.    Patient was previously seen by electrophysiology in 2018 for bradycardia.  He was on metoprolol at that time will which was discontinued and rates improved into the 50s to 60s.  He has not been on any AV smith blocking agents since that time.  As he was asymptomatic no treatment was needed aside from holding AV smith blocking agents.  His TSH is normal at 4.232.    Patient has been noted to be bradycardic on routine vital sign checks this admission.  ECG yesterday showing atrial fibrillation with transient complete heart block with ventricular escape rhythm showing right bundle-branch block pattern.  Review of ECG showing patient with bradycardia with rates in the 50s dating back to 2018.  Patient denies any  NOTIFICATION RETURN TO WORK / SCHOOL 
 
6/9/2020 2:09 PM 
 
Ms. Vivian Tavares [address] [place] [id number] To Whom It May Concern: 
 
Vivian Tavares is currently under the care of [address] [place]. She is to be excused from work until July 7/7/2020 If there are questions or concerns please have the patient contact our office. Sincerely, Nelile Rincon NP  
 
 dizziness, shortness of breath, chest pain or palpitation. He denies any history of syncope or loss of consciousness. He lives at home with his wife.    Past medical history:  Past Medical History:   Diagnosis Date   • Adult onset fluency disorder     stutters   • Atrial flutter (CMS/HCC)     during cath 4/3/2018   • Cervical radiculopathy at C8 4/16    rt side.    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 LIMA->D1 and closed->LAD but fills around to distal LAD. CX co-dominant and OK. RCA minor dz. No LV gram (couldn't cross prosthetic AV). RT heart with moderate->severe pulm HTN. RA 14 mmHg. RV 64/14 mmHg. PA 65/26 mmHg. PCW 27 mmHg. CO low 3.0 l/min by thermodilution. Consider TMVR. Also has chronic atrial flutter. Controlled rate.Could evaluate MV more by ANKITA. Will see Dr. Landeros.   • Cardiac surgery      CABG AVR MVR   • Colonoscopy remove lesions by snare  3/8/04    Dr Avelar also cold bx polyps, possible proctitis, f/u 3 yrs   • Past surgical history  4/03    avr, mcwilliams  mvr, karly cooper,    •  Past surgical history      rectal    • Removal of tonsils,<12 y/o     • Repair ing hernia,4+y/o,reducibl      left side,       Medications:  Current Facility-Administered Medications   Medication   • aluminum-magnesium hydroxide-simethicone (MAALOX) 200-200-20 MG/5ML suspension 30 mL   • bisacodyl (DULCOLAX) suppository 10 mg   • docusate sodium-sennosides (SENOKOT S) 50-8.6 MG 2 tablet   • magnesium hydroxide (MILK OF MAGNESIA) 400 MG/5ML suspension 10 mL   • Magnesium Standard Replacement Protocol   • polyethylene glycol (MIRALAX) packet 17 g   • Potassium Standard Replacement Protocol   • prochlorperazine (COMPAZINE) tablet 5 mg   • acetaminophen (TYLENOL) tablet 650 mg    Or   • acetaminophen (TYLENOL) suppository 650 mg   • aspirin chewable 81 mg   • atorvastatin (LIPITOR) tablet 80 mg   • allopurinol (ZYLOPRIM) tablet 200 mg   • docusate sodium-sennosides (SENOKOT S) 50-8.6 MG 2 tablet   • senna (SENOKOT) 8.6 mg   • WARFARIN - PHARMACIST MONITORED Misc       Allergies:  ALLERGIES:   Allergen Reactions   • Lisinopril      Cough       Family History:  Family History   Problem Relation Age of Onset   • Other Mother         osteoporosis, 89    • Other Father          age 68, gout and hsx of cva   • Arthritis Sister         both hips replaced, age 76 now.   • NEGATIVE FAMILY HX OF Daughter    • NEGATIVE FAMILY HX OF Son         lost a leg in mva, diabetes age 18, uses insulin, heart murnur, age 40 now. lives on [facility]       Social History:  Social History     Tobacco Use   • Smoking status: Never Smoker   • Smokeless tobacco: Never Used   Substance Use Topics   • Alcohol use: Not Currently   • Drug use: No       Review of systems:  Complete review of systems completed with pertinent positives and negatives noted in HPI    Physical Exam:  Visit Vitals  /64 (BP Location: LUE - Left upper extremity, Patient Position: Sitting)   Pulse (!) 55   Temp 97.7 °F (36.5 °C) (Oral)   Resp 18   Ht 5' 10\"  (1.778 m)   Wt 62 kg (136 lb 11 oz)   SpO2 97%   BMI 19.61 kg/m²     General:   Awake, alert and oriented and in no acute distress. Sitting up in chair  Head: Normocephalic, without obvious abnormality, atraumatic  Eyes: Sclera non icteric, EOMI (extraocular movements intact).  ENT/Mouth: No ear discharge, mucous membranes moist.  Neck: Supple, trachea midline.   Cardiovascular system: Irregular rate and rhythm, normal S1, S2, high pitched murmur.   Respiratory: Anterior: Normal vesicular breath sounds. Non labored. Equal expansion bilaterally.  Abdomen: Soft, nontender, BS (bowel sounds) present.   Psychiatric: Alert, oriented to time, place, and person. Mood and affect appropriate.  Skin: Warm and dry. No rashes.  Neurological: Equal strength to all four extremities.  Extremities: Trace edema to bilateral lower extremities      WBC (K/mcL)   Date Value   04/18/2022 5.3     HCT (%)   Date Value   04/18/2022 34.3 (L)     HGB (g/dL)   Date Value   04/18/2022 11.0 (L)     PLT (K/mcL)   Date Value   04/18/2022 156      Sodium (mmol/L)   Date Value   04/09/2022 143     Potassium (mmol/L)   Date Value   04/10/2022 4.5     Chloride (mmol/L)   Date Value   04/09/2022 112 (H)     Glucose (mg/dL)   Date Value   04/09/2022 107 (H)     Calcium (mg/dL)   Date Value   04/09/2022 8.4     Carbon Dioxide (mmol/L)   Date Value   04/09/2022 26     BUN (mg/dL)   Date Value   04/09/2022 29 (H)     Creatinine (mg/dL)   Date Value   04/09/2022 0.98      No components found for: GFR  B-TYPE NATRIURETIC PEPTIDE (pg/mL)   Date Value   04/03/2018 1,352 (H)      No results found for: DIGOXIN      GOT/AST (Units/L)   Date Value   04/05/2022 27     No results found for: ALT  Bilirubin, Total (mg/dL)   Date Value   04/05/2022 1.0      Prothrombin Time (sec)   Date Value   04/18/2022 22.3 (H)     INR (no units)   Date Value   04/18/2022 2.2       TSH (mcUnits/mL)   Date Value   11/16/2021 4.232     No results found for: T4FREE    Telemetry: Not  on telemetry    EC22 Atrial fibrillation with transient CHB with RBBB ventricular escape rhythm      22 Atrial fibrillation      ECHO: 22      Assessment:     Bradycardia, transient complete heart block with RBBB escape    - asymptomatic. Baseline LAFB    - TSH normal. Not on AV smith blocking agents.  Acute embolic CVA with right UE weakness and decreased balance   - transitioned to warfarin  Atrial fibrillation, persistent   - Previous therapy: metoprolol - DC'd in 2018             - CHADSVASC score 6 (CAD, CVA, HTN, age). Anticoagulated with warfarin             - Echo showing EF 50%. LA volume 75.7 mL. LVPW 0.9 cm             - TSH 4.232  LA thrombus, transitioned to warfarin (previously on eliquis)  Coronary artery disease s/p CABG   Valvular heart disease   - AVR, MVR    - tMVR   Hypertension, controlled   Hx of endocarditis     Plan:    Patient with known conduction disease and bradycardia. ECG with transient complete heart block with RBBB ventricular escape noted on 22.  Patient is asymptomatic. Discussed treatment options and he wishes to avoid PPM and observe as he is without symptoms.   Informed patient that if he were to develop symptoms related to bradycardia (ex: dizziness, lightheadedness, SOB or syncope) can reassess need for pacemaker.  Seen alongside and discussed with Dr. Arizmendi.    Caroline Woodward, KIARANP  -    Thank you for the consult. We will implement the above plan. Please call with questions.      Fadi ROUSE MD personally interviewed and examined this patient and formulated the impression and recommendations with the EP nurse practitioner, Caroline Woodward. Her note above was reviewed, confirmed, and amended as needed.  Chronic atrial fibrillation with transient complete heart block with ventricular escape rhythm.  Completely asymptomatic.  Not on any AV smith blocking agents. Advanced age with multiple comorbidities as described above.  The option  of a permanent pacemaker was reviewed with the patient.  He is completely coherent and lucid and understands the situation and prefers to take a conservative approach and not proceed with a pacemaker at this time as documented above.  In the absence of symptoms and with his overall situation, this is not unreasonable.  Will see as needed.      For EP questions between 7am-5pm please contact the NP/Fellow listed above. After 5pm and before 7am contact the answering service at [phone] and page on call EP Fellow.

## 2022-10-06 ENCOUNTER — OFFICE VISIT (OUTPATIENT)
Dept: INTERNAL MEDICINE CLINIC | Age: 32
End: 2022-10-06
Payer: COMMERCIAL

## 2022-10-06 VITALS
OXYGEN SATURATION: 98 % | TEMPERATURE: 98.6 F | BODY MASS INDEX: 24.41 KG/M2 | HEIGHT: 64 IN | SYSTOLIC BLOOD PRESSURE: 118 MMHG | WEIGHT: 143 LBS | HEART RATE: 85 BPM | RESPIRATION RATE: 16 BRPM | DIASTOLIC BLOOD PRESSURE: 62 MMHG

## 2022-10-06 DIAGNOSIS — E55.9 VITAMIN D DEFICIENCY: ICD-10-CM

## 2022-10-06 DIAGNOSIS — F41.1 GAD (GENERALIZED ANXIETY DISORDER): ICD-10-CM

## 2022-10-06 DIAGNOSIS — Z23 ENCOUNTER FOR IMMUNIZATION: ICD-10-CM

## 2022-10-06 DIAGNOSIS — Z83.3 FH: DIABETES MELLITUS: ICD-10-CM

## 2022-10-06 DIAGNOSIS — Z00.00 ROUTINE GENERAL MEDICAL EXAMINATION AT A HEALTH CARE FACILITY: Primary | ICD-10-CM

## 2022-10-06 PROCEDURE — 90686 IIV4 VACC NO PRSV 0.5 ML IM: CPT | Performed by: INTERNAL MEDICINE

## 2022-10-06 PROCEDURE — 90471 IMMUNIZATION ADMIN: CPT | Performed by: INTERNAL MEDICINE

## 2022-10-06 PROCEDURE — 99395 PREV VISIT EST AGE 18-39: CPT | Performed by: INTERNAL MEDICINE

## 2022-10-06 NOTE — PROGRESS NOTES
Nursing staff confirmed patient with full name and . Prepared patient for visit today by obtaining vitals, verifying medication list and allergies, and briefly discussing reason for visit. Chief Complaint   Patient presents with    Anxiety    Depression    Annual Exam       Current Outpatient Medications   Medication Sig    levonorgestreL (Kyleena) 17.5 mcg/24 hrs (5 yrs) 19.5 mg IUD IUD Kyleena 17.5 mcg/24 hrs (5yrs) 19.5mg intrauterine device   Take by intrauterine route. clonazePAM (KLONOPIN) 0.5 mg tablet Take 1 Tab by mouth as needed for Other (anxiety). Max Daily Amount: 48 mg. No current facility-administered medications for this visit. 1. \"Have you been to the ER, urgent care clinic since your last visit? Hospitalized since your last visit? \" No    2. \"Have you seen or consulted any other health care providers outside of the [address] [place] [address] since your last visit? \" No     3. For patients aged 39-70: Has the patient had a colonoscopy / FIT/ Cologuard? NA - based on age      If the patient is female:    4. For patients aged 41-77: Has the patient had a mammogram within the past 2 years? NA - based on age or sex      11. For patients aged 21-65: Has the patient had a pap smear? No    After obtaining consent, and per orders of Dr. Evelyn Kyle, injection of Flu vaccine given by Gricelda Greene. Patient instructed to remain in clinic for 20 minutes afterwards, and to report any adverse reaction to me immediately.

## 2022-10-06 NOTE — PROGRESS NOTES
HISTORY OF PRESENT ILLNESS  Lan Ayala is a 34 y.o. female here for complete physical.  She has autism. Working full-time. Staying in her own house. She suffers from anxiety, using Klonopin as needed. Doing well otherwise. Need blood work. Has family history of heart and diabetes, would like to get A1c checked. Her fasting blood sugar has been okay. Need flu shot. HPI    Review of Systems   Constitutional: Negative. HENT: Negative. Eyes: Negative. Respiratory: Negative. Cardiovascular: Negative. Gastrointestinal: Negative. Genitourinary: Negative. Musculoskeletal: Negative. Skin: Negative. Neurological: Negative. Endo/Heme/Allergies: Negative. Psychiatric/Behavioral:  The patient is nervous/anxious. Physical Exam  Constitutional:       Appearance: Normal appearance. She is normal weight. HENT:      Head: Normocephalic and atraumatic. Right Ear: Tympanic membrane normal.      Left Ear: Tympanic membrane normal.      Nose: Nose normal.   Eyes:      Extraocular Movements: Extraocular movements intact. Pupils: Pupils are equal, round, and reactive to light. Cardiovascular:      Rate and Rhythm: Normal rate and regular rhythm. Pulses: Normal pulses. Heart sounds: Normal heart sounds. Pulmonary:      Effort: Pulmonary effort is normal.      Breath sounds: Normal breath sounds. Musculoskeletal:         General: Normal range of motion. Cervical back: Normal range of motion and neck supple. Skin:     General: Skin is warm. Neurological:      General: No focal deficit present. Mental Status: She is alert and oriented to person, place, and time. Mental status is at baseline. Psychiatric:         Mood and Affect: Mood normal.         Behavior: Behavior normal.         Thought Content: Thought content normal.      Comments: She is autistic. Has anxiety. ASSESSMENT and PLAN    Diagnoses and all orders for this visit:    1. Routine general medical examination at a health care facility    Seems healthy. Advised to eat healthy and exercise. Will check,  -     CBC WITH AUTOMATED DIFF  -     METABOLIC PANEL, COMPREHENSIVE  -     TSH 3RD GENERATION  -     LIPID PANEL  -     URINALYSIS W/ REFLEX CULTURE  -     HEMOGLOBIN A1C WITH EAG    2. Encounter for immunization    Will order,  -     INFLUENZA, FLUARIX, FLULAVAL, FLUZONE (AGE 6 MO+), AFLURIA(AGE 3Y+) IM, PF, 0.5 ML    3. NURY (generalized anxiety disorder)  Doing well. Taking Klonopin seldom. no need for refill. 4. Vitamin D deficiency  -     VITAMIN D, 25 HYDROXY    5. FH: diabetes mellitus    Will order,  -     HEMOGLOBIN A1C WITH EAG  Discussed expected course/resolution/complications of diagnosis in detail with patient. Medication risks/benefits/costs/interactions/alternatives discussed with patient. Discussed COVID-19 infection precaution with patient. Pt was given an after visit summary which includes diagnoses, current medications & vitals. Pt expressed understanding with the diagnosis and plan.

## 2022-11-12 ENCOUNTER — HOSPITAL ENCOUNTER (EMERGENCY)
Age: 32
Discharge: HOME | End: 2022-11-12
Attending: STUDENT IN AN ORGANIZED HEALTH CARE EDUCATION/TRAINING PROGRAM
Payer: COMMERCIAL

## 2022-11-12 ENCOUNTER — APPOINTMENT (OUTPATIENT)
Dept: GENERAL RADIOLOGY | Age: 32
End: 2022-11-12
Attending: PHYSICIAN ASSISTANT
Payer: COMMERCIAL

## 2022-11-12 VITALS
SYSTOLIC BLOOD PRESSURE: 134 MMHG | OXYGEN SATURATION: 96 % | DIASTOLIC BLOOD PRESSURE: 83 MMHG | HEART RATE: 97 BPM | TEMPERATURE: 97.6 F | RESPIRATION RATE: 18 BRPM

## 2022-11-12 DIAGNOSIS — M79.642 LEFT HAND PAIN: Primary | ICD-10-CM

## 2022-11-12 LAB
BASOPHILS # BLD: 0 K/UL (ref 0–0.1)
BASOPHILS NFR BLD: 1 % (ref 0–1)
COMMENT, HOLDF: NORMAL
DIFFERENTIAL METHOD BLD: NORMAL
EOSINOPHIL # BLD: 0.1 K/UL (ref 0–0.4)
EOSINOPHIL NFR BLD: 2 % (ref 0–7)
ERYTHROCYTE [DISTWIDTH] IN BLOOD BY AUTOMATED COUNT: 11.8 % (ref 11.5–14.5)
HCT VFR BLD AUTO: 40.8 % (ref 35–47)
HGB BLD-MCNC: 13.5 G/DL (ref 11.5–16)
IMM GRANULOCYTES # BLD AUTO: 0 K/UL (ref 0–0.04)
IMM GRANULOCYTES NFR BLD AUTO: 0 % (ref 0–0.5)
LYMPHOCYTES # BLD: 1.8 K/UL (ref 0.8–3.5)
LYMPHOCYTES NFR BLD: 30 % (ref 12–49)
MCH RBC QN AUTO: 29.3 PG (ref 26–34)
MCHC RBC AUTO-ENTMCNC: 33.1 G/DL (ref 30–36.5)
MCV RBC AUTO: 88.5 FL (ref 80–99)
MONOCYTES # BLD: 0.5 K/UL (ref 0–1)
MONOCYTES NFR BLD: 9 % (ref 5–13)
NEUTS SEG # BLD: 3.5 K/UL (ref 1.8–8)
NEUTS SEG NFR BLD: 58 % (ref 32–75)
NRBC # BLD: 0 K/UL (ref 0–0.01)
NRBC BLD-RTO: 0 PER 100 WBC
PLATELET # BLD AUTO: 308 K/UL (ref 150–400)
PMV BLD AUTO: 9.5 FL (ref 8.9–12.9)
RBC # BLD AUTO: 4.61 M/UL (ref 3.8–5.2)
SAMPLES BEING HELD,HOLD: NORMAL
WBC # BLD AUTO: 6 K/UL (ref 3.6–11)

## 2022-11-12 PROCEDURE — 99284 EMERGENCY DEPT VISIT MOD MDM: CPT

## 2022-11-12 PROCEDURE — 73130 X-RAY EXAM OF HAND: CPT

## 2022-11-12 PROCEDURE — 36415 COLL VENOUS BLD VENIPUNCTURE: CPT

## 2022-11-12 PROCEDURE — 85025 COMPLETE CBC W/AUTO DIFF WBC: CPT

## 2022-11-12 NOTE — ED TRIAGE NOTES
Pt left hand knuckle is swollen. Pt is worried it is an infection.   Pt also has pain when she moves her finger in her knuckle

## 2022-11-12 NOTE — ED PROVIDER NOTES
34year old R handed female presenting for LEFT  2nd finger pain at the 2nd MCP joint. Notes that yesterday went to [facility] for right 2nd finger pain. Started Thursday evening, <48 hours ago. Mishel Mark indicate any instigating event. Ryley told patient yesterday it was maybe tenosynovitis. Gave naproxen and a splint. Cannot think of any trauma. No fever. Had naproxen at 0630 this AM.        PMHx: list UTD  PSx: nephrectomy  Social: non-smoker. No alcohol. .  Allergies UTD    The history is provided by the patient. Finger Pain        Past Medical History:   Diagnosis Date    Autism disorder     Cancer ([facility]     kidney,wilms tumor    Psychotic disorder ([facility]     NURY    Seizure ([facility]     Seizures ([facility]     ? epilepsy    Wilm's tumor (nephroblastoma) ([facility]        Past Surgical History:   Procedure Laterality Date    HX UROLOGICAL      nephrectomy not sure which side         Family History:   Adopted: Yes   Problem Relation Age of Onset    No Known Problems Brother        Social History     Socioeconomic History    Marital status: SINGLE     Spouse name: Not on file    Number of children: Not on file    Years of education: Not on file    Highest education level: Not on file   Occupational History    Not on file   Tobacco Use    Smoking status: Never    Smokeless tobacco: Never   Vaping Use    Vaping Use: Never used   Substance and Sexual Activity    Alcohol use: No    Drug use: No     Comment: working at [employer] ,living in [facility]    Sexual activity: Never     Birth control/protection: I.U.D.      Comment: working   Other Topics Concern    Not on file   Social History Narrative    Not on file     Social Determinants of Health     Financial Resource Strain: Low Risk     Difficulty of Paying Living Expenses: Not hard at all   Food Insecurity: No Food Insecurity    Worried About [address] in the Last Year: Never true    [address] [facility] [address] in the Last Year: Never true Transportation Needs: Not on file   Physical Activity: Not on file   Stress: Not on file   Social Connections: Not on file   Intimate Partner Violence: Not on file   Housing Stability: Not on file         ALLERGIES: Prozac [fluoxetine]    Review of Systems   Constitutional:  Negative for fever. HENT:  Negative for facial swelling. Respiratory:  Negative for shortness of breath. Cardiovascular:  Negative for chest pain. Gastrointestinal:  Negative for vomiting. Musculoskeletal:  Positive for arthralgias. Skin:  Negative for wound. Neurological:  Negative for syncope. All other systems reviewed and are negative. Vitals:    11/12/22 1009   BP: 134/83   Pulse: 97   Resp: 18   Temp: 97.6 °F (36.4 °C)   SpO2: 96%            Physical Exam  Vitals and nursing note reviewed. Constitutional:       General: She is not in acute distress. Appearance: She is well-developed. Comments: Well-appearing no distress   HENT:      Head: Normocephalic and atraumatic. Right Ear: External ear normal.      Left Ear: External ear normal.   Eyes:      General: No scleral icterus. Conjunctiva/sclera: Conjunctivae normal.   Neck:      Trachea: No tracheal deviation. Cardiovascular:      Rate and Rhythm: Normal rate and regular rhythm. Heart sounds: Normal heart sounds. No murmur heard. No friction rub. No gallop. Pulmonary:      Effort: Pulmonary effort is normal. No respiratory distress. Breath sounds: Normal breath sounds. No stridor. No wheezing. Abdominal:      General: There is no distension. Palpations: Abdomen is soft. Musculoskeletal:         General: Normal range of motion. Cervical back: Neck supple. Comments: LEFT hand: questionable slight swelling over the 2nd mcp joint/proximal phalanx. Minimally tender. No erythema or warmth. Skin intact. Full, normal ROM at the MCP joint and all interphalangeal joints. Normal distal sensation, cap refill.    Skin: General: Skin is warm and dry. Neurological:      Mental Status: She is alert and oriented to person, place, and time. Psychiatric:         Behavior: Behavior normal.        MDM  Number of Diagnoses or Management Options  Left hand pain  Diagnosis management comments: 34year old female presenting for <48 hour history of LEFT 2nd finger pain. No trauma. No erythema, warmth, fever, loss of ROM, trauma, wounds c/f infection. Pt notes that her mom really wanted bloodwork done \"to look for cellulitis or MRSA\" - CBC and XR normal.  Discussed possible causes of pain with patient, will encouraged continued immobilization, ice, NSAID, ortho hand follow up this week. Amount and/or Complexity of Data Reviewed  Clinical lab tests: ordered and reviewed  Tests in the radiology section of CPT®: ordered and reviewed  Discuss the patient with other providers: yes (Dr. Corinna Machado ED attending)           Procedures                             Spent almost 20 minutes in discussion with patient and her father via phone. Patient now notes that pain has actually been intermittent for a while, but never this bad. Currently patient denies any pain, no tenderness noted. Photo sent to dad at patient's request who agrees that he also does not see any redness or swelling. Discussed possible causes of pain with patient, recommended continued NSAID and RICE, ortho follow up.   STANLEY Moreira  1:19 PM

## 2022-11-12 NOTE — DISCHARGE INSTRUCTIONS
Return for new or worsening symptoms - continue naproxen and wear your splint. Call Monday AM to make a follow up appt with the hand specialist, Dr. Lea Alcantara.   Your XR and bloodwork today was normal.

## 2022-11-17 ENCOUNTER — OFFICE VISIT (OUTPATIENT)
Dept: ORTHOPEDIC SURGERY | Age: 32
End: 2022-11-17
Payer: COMMERCIAL

## 2022-11-17 VITALS — WEIGHT: 143 LBS | BODY MASS INDEX: 24.41 KG/M2 | HEIGHT: 64 IN

## 2022-11-17 DIAGNOSIS — M25.642 FINGER STIFFNESS, LEFT: Primary | ICD-10-CM

## 2022-11-17 PROCEDURE — 99203 OFFICE O/P NEW LOW 30 MIN: CPT | Performed by: ORTHOPAEDIC SURGERY

## 2022-11-17 RX ORDER — NAPROXEN 375 MG/1
TABLET ORAL
COMMUNITY
Start: 2022-11-11

## 2022-11-17 NOTE — PROGRESS NOTES
Dimitri Hopkins (: 1990) is a 34 y.o. female patient here for evaluation of the following chief complaint(s):  Hand Pain (Left index finger)       ASSESSMENT/PLAN:  Below is the assessment and plan developed based on review of pertinent history, physical exam, labs, studies, and medications. 1. Finger stiffness, left      At this time there is no sign of infection and no history of trauma x-rays are normal I recommended hand therapy to improve range of motion. She can follow-up as needed. She can use over-the-counter medications if needed for pain. Patient verbalized understanding and elected to proceed. All questions were answered to the patient's apparent satisfaction. SUBJECTIVE/OBJECTIVE:  HPI    27-year-old female with left index finger stiffness and intermittent pain. This been going on for about a week and a half. Denies any injury. She went to [facility] and then went to the emergency department. X-rays were obtained which were normal which I reviewed and agree with. Denies any trauma. Currently in the office not really having any pain. Patient reports a sudden onset of symptoms. Duration of problem 10 days. Symptom Severity 5/10  Symptom Frequency intermittent        Allergies   Allergen Reactions    Prozac [Fluoxetine] Other (comments)     depression         Current Outpatient Medications   Medication Sig    naproxen (NAPROSYN) 375 mg tablet TAKE 1 TABLET BY MOUTH TWICE A DAY FOR 7 DAYS    levonorgestreL (Kyleena) 17.5 mcg/24 hrs (5 yrs) 19.5 mg IUD IUD Kyleena 17.5 mcg/24 hrs (5yrs) 19.5mg intrauterine device   Take by intrauterine route. clonazePAM (KLONOPIN) 0.5 mg tablet Take 1 Tab by mouth as needed for Other (anxiety). Max Daily Amount: 48 mg. No current facility-administered medications for this visit.        Social History     Socioeconomic History    Marital status: SINGLE     Spouse name: Not on file    Number of children: Not on file    Years of education: Not on file    Highest education level: Not on file   Occupational History    Not on file   Tobacco Use    Smoking status: Never    Smokeless tobacco: Never   Vaping Use    Vaping Use: Never used   Substance and Sexual Activity    Alcohol use: No    Drug use: No     Comment: working at [employer] ,living in [facility]    Sexual activity: Never     Birth control/protection: I.U.D. Comment: working   Other Topics Concern    Not on file   Social History Narrative    Not on file     Social Determinants of Health     Financial Resource Strain: Low Risk     Difficulty of Paying Living Expenses: Not hard at all   Food Insecurity: No Food Insecurity    Worried About Running Out of Food in the Last Year: Never true    Ran Out of Food in the Last Year: Never true   Transportation Needs: Not on file   Physical Activity: Not on file   Stress: Not on file   Social Connections: Not on file   Intimate Partner Violence: Not on file   Housing Stability: Not on file       Past Surgical History:   Procedure Laterality Date    HX UROLOGICAL      nephrectomy not sure which side       Family History   Adopted: Yes   Problem Relation Age of Onset    No Known Problems Brother             Review of Systems    No flowsheet data found. Vitals:  Ht 5' 4\" (1.626 m)   Wt 143 lb (64.9 kg)   BMI 24.55 kg/m²    Estimated body surface area is 1.71 meters squared as calculated from the following:    Height as of this encounter: 5' 4\" (1.626 m). Weight as of this encounter: 143 lb (64.9 kg). Body mass index is 24.55 kg/m². Physical Exam    Musculoskeletal Exam:    Left Upper Extremity EXAMINATION    No tenderness to palpation about the upper extremity. No crepitus with range of motion. No redness, warmth or erythema. No palpable masses. Patient fires AIN, PIN and ulnar nerves. Sensation is grossly intact in the median, radial and ulnar distribution. Hand is pink and appears well-perfused.   Hand is warm.  Skin is intact. Compartments are soft and compressible. Currently no tenderness to palpation, negative torque test at the MP joint of the index finger. She localizes the pain at the index finger MP joint. No tenderness over the A1 pulley. She does have some stiffness with both active and passive MP joint flexion lacking 20 degrees compared to contralateral side. Consitutional: Healthy  Skin:   - Edema - none  - Cellulitis - No    Neuro: Numbness or tingling in R/L arm: No    Psych: Affect normal    Cardiovascular: Capillary Refill < 2 seconds in upper extremities    Respiratory: Non-Labored Breathing    ROS:    Constitutional: Denies fever/chills    Respiratory: Denies SOB        Imaging:    XR Results (most recent):  Results from Hospital Encounter encounter on 11/12/22    XR HAND LT MIN 3 V    Narrative  EXAM: XR HAND LT MIN 3 V    INDICATION: hand pain. COMPARISON: None. FINDINGS: Three views of the left hand demonstrate no fracture, dislocation or  other acute osseous or articular abnormality. The soft tissues are within normal  limits. Impression  No acute abnormality. An electronic signature was used to authenticate this note.   -- Sharyle Favorite, MD

## 2022-11-23 NOTE — PROGRESS NOTES
ALT is slightly elevated. AST normal.  Need to watch fatty food and carbohydrate. Avoid alcohol use. Repeat and liver function testing a month. Trace bacteria in urine. Need to drink more fluid. No diabetes.

## 2023-03-16 ENCOUNTER — NURSE TRIAGE (OUTPATIENT)
Dept: OTHER | Facility: CLINIC | Age: 33
End: 2023-03-16

## 2023-03-16 ENCOUNTER — DOCUMENTATION ONLY (OUTPATIENT)
Dept: INTERNAL MEDICINE CLINIC | Age: 33
End: 2023-03-16

## 2023-03-16 NOTE — PROGRESS NOTES
Pt triaged from [facility] ([facility]) for abdominal pain and dark stool. No open appointments.  Pt advised to go to [facility]- address given

## 2023-03-16 NOTE — TELEPHONE ENCOUNTER
Location of patient: [address] leblanc from [place] [facility] with [employer]. Subjective: Caller states she is having upper abdominal pain radiates into. Pt states she is also having loose stool last Friday. Pt states had some dark color stool. Pt has had some nausea. Pt states she does have a hx of GERD    Current Symptoms: As above    Onset: 4 weeks ago;     Associated Symptoms: NA    Pain Severity: 5/10; burning; constant    Temperature: Denies     What has been tried: Pepcid. Pepto and Tums    LMP:  now  Pregnant: No    Recommended disposition: See in Office Today or Tomorrow    Care advice provided, patient verbalizes understanding; denies any other questions or concerns; instructed to call back for any new or worsening symptoms. Patient/Caller agrees with recommended disposition; writer provided warm transfer to [facility] for appointment scheduling    Attention Provider: Thank you for allowing me to participate in the care of your patient. The patient was connected to triage in response to information provided to the [facility]. Please do not respond through this encounter as the response is not directed to a shared pool.     Reason for Disposition   MILD TO MODERATE pain that comes and goes (cramps) lasts > 24 hours    Protocols used: Abdominal Pain - Upper-ADULT-[place]

## 2023-03-17 ENCOUNTER — OFFICE VISIT (OUTPATIENT)
Dept: URGENT CARE | Age: 33
End: 2023-03-17

## 2023-03-17 VITALS
BODY MASS INDEX: 25.95 KG/M2 | OXYGEN SATURATION: 99 % | DIASTOLIC BLOOD PRESSURE: 78 MMHG | TEMPERATURE: 98.4 F | SYSTOLIC BLOOD PRESSURE: 124 MMHG | WEIGHT: 152 LBS | HEIGHT: 64 IN | HEART RATE: 95 BPM | RESPIRATION RATE: 16 BRPM

## 2023-03-17 DIAGNOSIS — K21.9 GASTROESOPHAGEAL REFLUX DISEASE, UNSPECIFIED WHETHER ESOPHAGITIS PRESENT: Primary | ICD-10-CM

## 2023-03-17 DIAGNOSIS — R19.5 DARK STOOLS: ICD-10-CM

## 2023-03-17 DIAGNOSIS — K64.9 HEMORRHOIDS, UNSPECIFIED HEMORRHOID TYPE: ICD-10-CM

## 2023-03-17 LAB
HEMOCCULT STL QL: NEGATIVE
VALID INTERNAL CONTROL?: YES

## 2023-03-17 RX ORDER — HYDROGEN PEROXIDE 3 %
20 SOLUTION, NON-ORAL MISCELLANEOUS DAILY
Qty: 14 CAPSULE | Refills: 0 | Status: SHIPPED | OUTPATIENT
Start: 2023-03-17 | End: 2023-03-31

## 2023-03-17 NOTE — PROGRESS NOTES
HPI   Pt presents with complaints of acid reflux, nausea, constipation, diarrhea, and generalized abdominal discomfort for 4 weeks, gradually worsening. Having diarrhea about 2x/week. Reports dark stools. Denies bloody stools or with mucous. Denies fevers. Hx acid reflux. Taking pepcid, tums, prilosec and BRAT diet without relief. BRAT diet helps somewhat. Appetite is fair. Past Medical History:   Diagnosis Date    Autism disorder     Cancer ([facility]     kidney,wilms tumor    Psychotic disorder ([facility]     NURY    Seizure ([facility]     Seizures ([facility]     ? epilepsy    Wilm's tumor (nephroblastoma) ([facility]         Past Surgical History:   Procedure Laterality Date    HX UROLOGICAL      nephrectomy not sure which side         Family History   Adopted: Yes   Problem Relation Age of Onset    No Known Problems Brother         Social History     Socioeconomic History    Marital status: SINGLE     Spouse name: Not on file    Number of children: Not on file    Years of education: Not on file    Highest education level: Not on file   Occupational History    Not on file   Tobacco Use    Smoking status: Never    Smokeless tobacco: Never   Vaping Use    Vaping Use: Never used   Substance and Sexual Activity    Alcohol use: No    Drug use: No     Comment: working at [employer] ,living in [facility]    Sexual activity: Never     Birth control/protection: I.U.D.      Comment: working   Other Topics Concern    Not on file   Social History Narrative    Not on file     Social Determinants of Health     Financial Resource Strain: Low Risk     Difficulty of Paying Living Expenses: Not hard at all   Food Insecurity: No Food Insecurity    Worried About Running Out of Food in the Last Year: Never true    Ran Out of Food in the Last Year: Never true   Transportation Needs: Not on file   Physical Activity: Not on file   Stress: Not on file   Social Connections: Not on file   Intimate Partner Violence: Not on file   Housing Stability: Not on file                ALLERGIES: Prozac [fluoxetine]    Review of Systems   Constitutional:  Positive for appetite change. Negative for fatigue, fever and unexpected weight change. Cardiovascular:  Negative for chest pain. Gastrointestinal:  Positive for abdominal pain, constipation, diarrhea and nausea. Negative for anal bleeding, blood in stool, rectal pain and vomiting. Vitals:    03/17/23 1734   BP: 124/78   Pulse: 95   Resp: 16   Temp: 98.4 °F (36.9 °C)   SpO2: 99%   Weight: 152 lb (68.9 kg)   Height: 5' 4\" (1.626 m)       Physical Exam  Constitutional:       General: She is not in acute distress. Appearance: Normal appearance. She is well-developed. She is not ill-appearing or toxic-appearing. HENT:      Head: Normocephalic and atraumatic. Right Ear: Tympanic membrane, ear canal and external ear normal.      Left Ear: Tympanic membrane, ear canal and external ear normal.      Nose: Nose normal.      Mouth/Throat:      Mouth: Mucous membranes are moist.      Pharynx: Oropharynx is clear. Eyes:      Extraocular Movements: Extraocular movements intact. Conjunctiva/sclera: Conjunctivae normal.      Pupils: Pupils are equal, round, and reactive to light. Cardiovascular:      Rate and Rhythm: Normal rate and regular rhythm. Heart sounds: Normal heart sounds. Pulmonary:      Effort: Pulmonary effort is normal.      Breath sounds: Normal breath sounds. Abdominal:      General: Abdomen is flat. Bowel sounds are normal.      Palpations: Abdomen is soft. Tenderness: There is no abdominal tenderness. There is no guarding. Comments: Small hemorrhoid, not thrombosed   Musculoskeletal:      Cervical back: Normal range of motion and neck supple. Lymphadenopathy:      Cervical: No cervical adenopathy. Skin:     General: Skin is warm and dry. Neurological:      General: No focal deficit present.       Mental Status: She is alert and oriented to person, place, and time. Results for orders placed or performed in visit on 03/17/23   AMB POC FECAL BLOOD, OCCULT, QL 1 CARD   Result Value Ref Range    VALID INTERNAL CONTROL POC Yes     Hemoccult (POC) Negative Negative       ICD-10-CM ICD-9-CM   1. Gastroesophageal reflux disease, unspecified whether esophagitis present  K21.9 530.81   2. Dark stools  R19.5 792.1   3. Hemorrhoids, unspecified hemorrhoid type  K64.9 455.6       Orders Placed This Encounter    AMB POC FECAL BLOOD, OCCULT, QL 1 CARD    esomeprazole (NexIUM) 20 mg capsule     Sig: Take 1 Capsule by mouth daily for 14 days. Dispense:  14 Capsule     Refill:  0      Mitchell diet. The patient is to follow up with PCP [facility]. If signs and symptoms become worse the pt is to go to the ER.      Deidra Dobbs NP         MDM    Procedures

## 2023-05-08 ENCOUNTER — OFFICE VISIT (OUTPATIENT)
Age: 33
End: 2023-05-08
Payer: COMMERCIAL

## 2023-05-08 VITALS
HEIGHT: 64 IN | DIASTOLIC BLOOD PRESSURE: 66 MMHG | OXYGEN SATURATION: 98 % | WEIGHT: 151.9 LBS | HEART RATE: 78 BPM | BODY MASS INDEX: 25.93 KG/M2 | SYSTOLIC BLOOD PRESSURE: 128 MMHG

## 2023-05-08 DIAGNOSIS — R29.898 WEAKNESS OF LEFT HAND: Primary | ICD-10-CM

## 2023-05-08 PROCEDURE — 99204 OFFICE O/P NEW MOD 45 MIN: CPT | Performed by: PSYCHIATRY & NEUROLOGY

## 2023-05-08 ASSESSMENT — PATIENT HEALTH QUESTIONNAIRE - PHQ9
SUM OF ALL RESPONSES TO PHQ QUESTIONS 1-9: 0
2. FEELING DOWN, DEPRESSED OR HOPELESS: 0
SUM OF ALL RESPONSES TO PHQ QUESTIONS 1-9: 0
SUM OF ALL RESPONSES TO PHQ9 QUESTIONS 1 & 2: 0
1. LITTLE INTEREST OR PLEASURE IN DOING THINGS: 0

## 2023-05-10 ENCOUNTER — PROCEDURE VISIT (OUTPATIENT)
Age: 33
End: 2023-05-10
Payer: COMMERCIAL

## 2023-05-10 DIAGNOSIS — R20.9 SKIN SENSATION DISTURBANCE: ICD-10-CM

## 2023-05-10 DIAGNOSIS — R29.898 WEAKNESS OF LEFT HAND: Primary | ICD-10-CM

## 2023-05-10 PROCEDURE — 95909 NRV CNDJ TST 5-6 STUDIES: CPT | Performed by: PSYCHIATRY & NEUROLOGY

## 2023-05-10 PROCEDURE — 95886 MUSC TEST DONE W/N TEST COMP: CPT | Performed by: PSYCHIATRY & NEUROLOGY

## 2023-05-10 NOTE — PROGRESS NOTES
[address] [facility]  [facility]  [address] [facility], [address] [place] [address]  Phone [phone] Fax [phone]  Test Date:  5/10/2023    Patient: Petra Jackson : 1990 Physician: Hanna Hammer. Wesley Welch DO   Sex: Female Height: 5' 4\" Ref Phys: Hanna Hammer. Wesley Welch DO   ID#: [id number] Weight: 151 lbs. Technician: Lacie Lewis     Patient Complaints:  Left hand weakness; Skin sensation disturbance    NCV & EMG Findings:  All nerve conduction studies were within normal limits. All F Wave latencies were within normal limits. All examined muscles (as indicated in the following table) showed no evidence of electrical instability. Impression:  Extensive electrodiagnostic examination of the left upper extremity reveals no abnormalities. In particular, there is no evidence of a left cervical motor radiculopathy. In addition, there is no evidence of a median neuropathy. ___________________________  Reina Welch DO        Nerve Conduction Studies  Anti Sensory Summary Table     Stim Site NR Peak (ms) Norm Peak (ms) P-T Amp (µV) Norm P-T Amp Onset (ms) Site1 Site2 Delta-P (ms) Dist (cm) Gustavo (m/s) Norm Gustavo (m/s)   Left Median Anti Sensory (2nd Digit)  31.9°C   Wrist    2.6 <3.6 69.5 >10 2.1 Wrist 2nd Digit 2.6 14.0 54 >39   Left Radial Anti Sensory (Base 1st Digit)  32.1°C   Wrist    1.8 <3.1 59.7  1.5 Wrist Base 1st Digit 1.8 0.0     Left Ulnar Anti Sensory (5th Digit)  32.1°C   Wrist    2.6 <3.7 53.5 >15.0 2.1 Wrist 5th Digit 2.6 14.0 54 >38     Motor Summary Table     Stim Site NR Onset (ms) Norm Onset (ms) O-P Amp (mV) Norm O-P Amp Site1 Site2 Delta-0 (ms) Dist (cm) Gustavo (m/s) Norm Gustavo (m/s)   Left Median Motor (Abd Poll Brev)  32.1°C   Wrist    2.3 <4.2 9.9 >5 Elbow Wrist 4.0 26.0 65 >50   Elbow    6.3  9.6          Left Ulnar Motor (Abd Dig Minimi)  32.1°C   Wrist    2.9 <4.2 7.4 >3 B Elbow Wrist 2.9 16.0 55 >53   B Elbow    5.8

## 2023-10-30 ENCOUNTER — OFFICE VISIT (OUTPATIENT)
Age: 33
End: 2023-10-30
Payer: COMMERCIAL

## 2023-10-30 VITALS
BODY MASS INDEX: 27.35 KG/M2 | HEIGHT: 64 IN | OXYGEN SATURATION: 98 % | HEART RATE: 95 BPM | RESPIRATION RATE: 16 BRPM | WEIGHT: 160.2 LBS | DIASTOLIC BLOOD PRESSURE: 76 MMHG | TEMPERATURE: 98.6 F | SYSTOLIC BLOOD PRESSURE: 102 MMHG

## 2023-10-30 DIAGNOSIS — F84.0 AUTISM: ICD-10-CM

## 2023-10-30 DIAGNOSIS — Z00.00 ROUTINE GENERAL MEDICAL EXAMINATION AT A HEALTH CARE FACILITY: Primary | ICD-10-CM

## 2023-10-30 DIAGNOSIS — M77.8 WRIST TENDONITIS: ICD-10-CM

## 2023-10-30 DIAGNOSIS — Z23 NEEDS FLU SHOT: ICD-10-CM

## 2023-10-30 PROCEDURE — 90674 CCIIV4 VAC NO PRSV 0.5 ML IM: CPT | Performed by: INTERNAL MEDICINE

## 2023-10-30 PROCEDURE — 90471 IMMUNIZATION ADMIN: CPT | Performed by: INTERNAL MEDICINE

## 2023-10-30 PROCEDURE — 99395 PREV VISIT EST AGE 18-39: CPT | Performed by: INTERNAL MEDICINE

## 2023-10-30 SDOH — ECONOMIC STABILITY: HOUSING INSECURITY
IN THE LAST 12 MONTHS, WAS THERE A TIME WHEN YOU DID NOT HAVE A STEADY PLACE TO SLEEP OR SLEPT IN A SHELTER (INCLUDING NOW)?: NO

## 2023-10-30 SDOH — ECONOMIC STABILITY: INCOME INSECURITY: HOW HARD IS IT FOR YOU TO PAY FOR THE VERY BASICS LIKE FOOD, HOUSING, MEDICAL CARE, AND HEATING?: NOT HARD AT ALL

## 2023-10-30 SDOH — ECONOMIC STABILITY: FOOD INSECURITY: WITHIN THE PAST 12 MONTHS, THE FOOD YOU BOUGHT JUST DIDN'T LAST AND YOU DIDN'T HAVE MONEY TO GET MORE.: NEVER TRUE

## 2023-10-30 SDOH — ECONOMIC STABILITY: FOOD INSECURITY: WITHIN THE PAST 12 MONTHS, YOU WORRIED THAT YOUR FOOD WOULD RUN OUT BEFORE YOU GOT MONEY TO BUY MORE.: NEVER TRUE

## 2023-10-30 ASSESSMENT — PATIENT HEALTH QUESTIONNAIRE - PHQ9
SUM OF ALL RESPONSES TO PHQ QUESTIONS 1-9: 0
SUM OF ALL RESPONSES TO PHQ9 QUESTIONS 1 & 2: 0
2. FEELING DOWN, DEPRESSED OR HOPELESS: 0
1. LITTLE INTEREST OR PLEASURE IN DOING THINGS: 0
SUM OF ALL RESPONSES TO PHQ QUESTIONS 1-9: 0

## 2023-10-30 ASSESSMENT — ENCOUNTER SYMPTOMS
GASTROINTESTINAL NEGATIVE: 1
RESPIRATORY NEGATIVE: 1
EYES NEGATIVE: 1

## 2023-10-30 NOTE — PROGRESS NOTES
Nursing staff confirmed patient with full name and . Prepared patient for visit today by obtaining vitals, verifying medication list and allergies, and briefly discussing reason for visit. Chief Complaint   Patient presents with    Annual Exam       1. \"Have you been to the ER, urgent care clinic since your last visit? Hospitalized since your last visit? \" NO    2. \"Have you seen or consulted any other health care providers outside of the [address] [place] [address] since your last visit? \" NO    5. For patients aged 21-65: Has the patient had a pap smear?  YES [facility]

## 2023-10-30 NOTE — PROGRESS NOTES
Subjective:      Patient ID: Juanis Rutledge is a 28 y.o. female here for complete physical.  Reported pain in both wrist in the medial area. She is an  and uses computer a lot. No fall or injury. She is autistic, able to work full-time. Lives alone, able to take care of herself. Doing okay. Suffer from anxiety, taking Klonopin seldom. Doing okay. Well woman visit up-to-date. Need flu shot. HPI    Review of Systems   Constitutional: Negative. HENT: Negative. Eyes: Negative. Respiratory: Negative. Cardiovascular: Negative. Gastrointestinal: Negative. Endocrine: Negative. Genitourinary: Negative. Musculoskeletal: Negative. Skin: Negative. Neurological: Negative. Hematological: Negative. Psychiatric/Behavioral: Negative. Objective:   Physical Exam  Constitutional:       Appearance: Normal appearance. HENT:      Head: Normocephalic and atraumatic. Right Ear: Tympanic membrane normal.      Left Ear: Tympanic membrane normal.      Nose: Nose normal.      Mouth/Throat:      Mouth: Mucous membranes are dry. Pharynx: Oropharynx is clear. Cardiovascular:      Rate and Rhythm: Normal rate and regular rhythm. Pulses: Normal pulses. Heart sounds: Normal heart sounds. Pulmonary:      Effort: Pulmonary effort is normal.      Breath sounds: Normal breath sounds. Abdominal:      General: Abdomen is flat. Bowel sounds are normal.      Palpations: Abdomen is soft. Musculoskeletal:         General: Normal range of motion. Cervical back: Normal range of motion and neck supple. Skin:     General: Skin is warm. Neurological:      General: No focal deficit present. Mental Status: She is alert and oriented to person, place, and time. Mental status is at baseline. Psychiatric:         Mood and Affect: Mood normal.         Behavior: Behavior normal.         Thought Content:  Thought content normal.         Assessment /

## 2023-11-10 LAB
BASOPHILS # BLD AUTO: 0 X10E3/UL (ref 0–0.2)
BASOPHILS NFR BLD AUTO: 0 %
EOSINOPHIL # BLD AUTO: 0.1 X10E3/UL (ref 0–0.4)
EOSINOPHIL NFR BLD AUTO: 1 %
ERYTHROCYTE [DISTWIDTH] IN BLOOD BY AUTOMATED COUNT: 12.3 % (ref 11.7–15.4)
HCT VFR BLD AUTO: 41.5 % (ref 34–46.6)
HGB BLD-MCNC: 13.9 G/DL (ref 11.1–15.9)
IMM GRANULOCYTES # BLD AUTO: 0 X10E3/UL (ref 0–0.1)
IMM GRANULOCYTES NFR BLD AUTO: 0 %
LYMPHOCYTES # BLD AUTO: 1.6 X10E3/UL (ref 0.7–3.1)
LYMPHOCYTES NFR BLD AUTO: 23 %
MCH RBC QN AUTO: 29.5 PG (ref 26.6–33)
MCHC RBC AUTO-ENTMCNC: 33.5 G/DL (ref 31.5–35.7)
MCV RBC AUTO: 88 FL (ref 79–97)
MONOCYTES # BLD AUTO: 0.6 X10E3/UL (ref 0.1–0.9)
MONOCYTES NFR BLD AUTO: 9 %
NEUTROPHILS # BLD AUTO: 4.6 X10E3/UL (ref 1.4–7)
NEUTROPHILS NFR BLD AUTO: 67 %
PLATELET # BLD AUTO: 354 X10E3/UL (ref 150–450)
RBC # BLD AUTO: 4.71 X10E6/UL (ref 3.77–5.28)
WBC # BLD AUTO: 7 X10E3/UL (ref 3.4–10.8)

## 2023-11-11 LAB
ALBUMIN SERPL-MCNC: 4.3 G/DL (ref 3.9–4.9)
ALBUMIN/GLOB SERPL: 1.3 {RATIO} (ref 1.2–2.2)
ALP SERPL-CCNC: 104 IU/L (ref 44–121)
ALT SERPL-CCNC: 69 IU/L (ref 0–32)
APPEARANCE UR: CLEAR
AST SERPL-CCNC: 53 IU/L (ref 0–40)
BACTERIA #/AREA URNS HPF: NORMAL /[HPF]
BILIRUB SERPL-MCNC: <0.2 MG/DL (ref 0–1.2)
BILIRUB UR QL STRIP: NEGATIVE
BUN SERPL-MCNC: 14 MG/DL (ref 6–20)
BUN/CREAT SERPL: 16 (ref 9–23)
CALCIUM SERPL-MCNC: 9.4 MG/DL (ref 8.7–10.2)
CASTS URNS QL MICRO: NORMAL /LPF
CHLORIDE SERPL-SCNC: 105 MMOL/L (ref 96–106)
CHOLEST SERPL-MCNC: 134 MG/DL (ref 100–199)
CO2 SERPL-SCNC: 23 MMOL/L (ref 20–29)
COLOR UR: YELLOW
CREAT SERPL-MCNC: 0.87 MG/DL (ref 0.57–1)
EGFRCR SERPLBLD CKD-EPI 2021: 90 ML/MIN/1.73
EPI CELLS #/AREA URNS HPF: NORMAL /HPF (ref 0–10)
GLOBULIN SER CALC-MCNC: 3.2 G/DL (ref 1.5–4.5)
GLUCOSE SERPL-MCNC: 94 MG/DL (ref 70–99)
GLUCOSE UR QL STRIP: NEGATIVE
HDLC SERPL-MCNC: 35 MG/DL
HGB UR QL STRIP: NEGATIVE
IMP & REVIEW OF LAB RESULTS: NORMAL
KETONES UR QL STRIP: NEGATIVE
LDLC SERPL CALC-MCNC: 82 MG/DL (ref 0–99)
LEUKOCYTE ESTERASE UR QL STRIP: NEGATIVE
MICRO URNS: NORMAL
MICRO URNS: NORMAL
NITRITE UR QL STRIP: NEGATIVE
PH UR STRIP: 6.5 [PH] (ref 5–7.5)
POTASSIUM SERPL-SCNC: 4.9 MMOL/L (ref 3.5–5.2)
PROT SERPL-MCNC: 7.5 G/DL (ref 6–8.5)
PROT UR QL STRIP: NEGATIVE
RBC #/AREA URNS HPF: NORMAL /HPF (ref 0–2)
SODIUM SERPL-SCNC: 142 MMOL/L (ref 134–144)
SP GR UR STRIP: 1.02 (ref 1–1.03)
TRIGL SERPL-MCNC: 88 MG/DL (ref 0–149)
TSH SERPL DL<=0.005 MIU/L-ACNC: 1.66 UIU/ML (ref 0.45–4.5)
UROBILINOGEN UR STRIP-MCNC: 1 MG/DL (ref 0.2–1)
VLDLC SERPL CALC-MCNC: 17 MG/DL (ref 5–40)
WBC #/AREA URNS HPF: NORMAL /HPF (ref 0–5)

## 2023-11-16 DIAGNOSIS — R74.8 ELEVATED LIVER ENZYMES: Primary | ICD-10-CM

## 2023-12-16 DIAGNOSIS — R74.8 ELEVATED LIVER ENZYMES: ICD-10-CM

## 2024-02-08 ENCOUNTER — OFFICE VISIT (OUTPATIENT)
Age: 34
End: 2024-02-08
Payer: COMMERCIAL

## 2024-02-08 VITALS
DIASTOLIC BLOOD PRESSURE: 70 MMHG | SYSTOLIC BLOOD PRESSURE: 102 MMHG | WEIGHT: 168 LBS | RESPIRATION RATE: 16 BRPM | OXYGEN SATURATION: 97 % | BODY MASS INDEX: 28.68 KG/M2 | TEMPERATURE: 96.9 F | HEART RATE: 115 BPM | HEIGHT: 64 IN

## 2024-02-08 DIAGNOSIS — F84.0 AUTISM: ICD-10-CM

## 2024-02-08 DIAGNOSIS — F41.1 GENERALIZED ANXIETY DISORDER: ICD-10-CM

## 2024-02-08 DIAGNOSIS — M25.562 ACUTE PAIN OF BOTH KNEES: Primary | ICD-10-CM

## 2024-02-08 DIAGNOSIS — R79.89 ELEVATED LFTS: ICD-10-CM

## 2024-02-08 DIAGNOSIS — M25.561 ACUTE PAIN OF BOTH KNEES: Primary | ICD-10-CM

## 2024-02-08 PROCEDURE — 99214 OFFICE O/P EST MOD 30 MIN: CPT | Performed by: INTERNAL MEDICINE

## 2024-02-08 RX ORDER — DICLOFENAC SODIUM 75 MG/1
75 TABLET, DELAYED RELEASE ORAL 2 TIMES DAILY PRN
Qty: 20 TABLET | Refills: 0 | Status: SHIPPED | OUTPATIENT
Start: 2024-02-08

## 2024-02-08 RX ORDER — KETOROLAC TROMETHAMINE 10 MG/1
TABLET, FILM COATED ORAL
COMMUNITY
Start: 2023-12-31

## 2024-02-08 ASSESSMENT — ENCOUNTER SYMPTOMS
RESPIRATORY NEGATIVE: 1
EYES NEGATIVE: 1
GASTROINTESTINAL NEGATIVE: 1

## 2024-02-08 ASSESSMENT — PATIENT HEALTH QUESTIONNAIRE - PHQ9
SUM OF ALL RESPONSES TO PHQ QUESTIONS 1-9: 0
SUM OF ALL RESPONSES TO PHQ9 QUESTIONS 1 & 2: 0
2. FEELING DOWN, DEPRESSED OR HOPELESS: 0
SUM OF ALL RESPONSES TO PHQ QUESTIONS 1-9: 0
1. LITTLE INTEREST OR PLEASURE IN DOING THINGS: 0

## 2024-02-08 NOTE — PROGRESS NOTES
Nursing staff confirmed patient with full name and . Prepared patient for visit today by obtaining vitals, verifying medication list and allergies, and briefly discussing reason for visit.       Chief Complaint   Patient presents with    Knee Pain       1. \"Have you been to the ER, urgent care clinic since your last visit?  Hospitalized since your last visit?\" NO    2. \"Have you seen or consulted any other health care providers outside of the [facility] since your last visit?\" NO      5. For patients aged 21-65: Has the patient had a pap smear? YES

## 2024-02-08 NOTE — PROGRESS NOTES
Subjective:      Patient ID: Lucy Isaac is a 33 y.o. female here for follow-up.  She is autistic, lives in her own place.  Noticed to have bilateral knee pain on and off for last several weeks.  She mention she went to [employer] with her boyfriend and she was tried to sit and get stuff from lower shelf.  When she was getting up, she suddenly fell and twisted the knee and on the knee almost felt locking.  Since then she is having on and off bilateral knee pain.  No fall or injury.  Elevated liver function test, trying to watch her diet, not drinking alcohol.  Need to repeat lab work.    Wrist pain has improved, she has developed some baclofen for her.  Advised her to use it for me.  Has recent under control.  She is taking Klonopin as needed.  Labs reviewed.  Seems stable.    Knee Pain       Review of Systems   Constitutional: Negative.    HENT: Negative.     Eyes: Negative.    Respiratory: Negative.     Gastrointestinal: Negative.    Endocrine: Negative.    Genitourinary: Negative.    Musculoskeletal:  Positive for arthralgias.   Skin: Negative.    Neurological: Negative.    Hematological: Negative.    Psychiatric/Behavioral: Negative.         Objective:   Physical Exam  Constitutional:       Appearance: Normal appearance.   Cardiovascular:      Rate and Rhythm: Normal rate and regular rhythm.      Pulses: Normal pulses.      Heart sounds: Normal heart sounds.   Pulmonary:      Effort: Pulmonary effort is normal.      Breath sounds: Normal breath sounds.   Abdominal:      General: Abdomen is flat. Bowel sounds are normal.      Palpations: Abdomen is soft.   Musculoskeletal:         General: Swelling present. Normal range of motion.      Cervical back: Normal range of motion and neck supple.      Comments: Bilateral knee: Mild swelling present.  Nontender.  Range of motion okay.   Skin:     General: Skin is warm.   Neurological:      General: No focal deficit present.      Mental Status: She is alert and

## 2024-02-09 LAB
ALT SERPL-CCNC: 43 IU/L (ref 0–32)
AST SERPL-CCNC: 31 IU/L (ref 0–40)

## 2024-02-13 DIAGNOSIS — R74.8 ELEVATED LIVER ENZYMES: Primary | ICD-10-CM

## 2024-02-14 DIAGNOSIS — M25.561 ACUTE PAIN OF BOTH KNEES: ICD-10-CM

## 2024-02-14 DIAGNOSIS — M25.562 ACUTE PAIN OF BOTH KNEES: ICD-10-CM

## 2024-02-14 RX ORDER — DICLOFENAC SODIUM 75 MG/1
75 TABLET, DELAYED RELEASE ORAL 2 TIMES DAILY
Qty: 20 TABLET | Refills: 0 | Status: SHIPPED | OUTPATIENT
Start: 2024-02-14

## 2024-04-08 ENCOUNTER — OFFICE VISIT (OUTPATIENT)
Age: 34
End: 2024-04-08
Payer: COMMERCIAL

## 2024-04-08 VITALS
DIASTOLIC BLOOD PRESSURE: 70 MMHG | SYSTOLIC BLOOD PRESSURE: 110 MMHG | HEART RATE: 96 BPM | TEMPERATURE: 97.2 F | HEIGHT: 64 IN | RESPIRATION RATE: 16 BRPM | WEIGHT: 171 LBS | OXYGEN SATURATION: 98 % | BODY MASS INDEX: 29.19 KG/M2

## 2024-04-08 DIAGNOSIS — M25.562 PATELLOFEMORAL ARTHRALGIA OF LEFT KNEE: Primary | ICD-10-CM

## 2024-04-08 DIAGNOSIS — F84.0 AUTISM: ICD-10-CM

## 2024-04-08 PROCEDURE — 99213 OFFICE O/P EST LOW 20 MIN: CPT | Performed by: INTERNAL MEDICINE

## 2024-04-08 ASSESSMENT — PATIENT HEALTH QUESTIONNAIRE - PHQ9
SUM OF ALL RESPONSES TO PHQ QUESTIONS 1-9: 0
1. LITTLE INTEREST OR PLEASURE IN DOING THINGS: NOT AT ALL
SUM OF ALL RESPONSES TO PHQ QUESTIONS 1-9: 0
SUM OF ALL RESPONSES TO PHQ9 QUESTIONS 1 & 2: 0
SUM OF ALL RESPONSES TO PHQ QUESTIONS 1-9: 0
2. FEELING DOWN, DEPRESSED OR HOPELESS: NOT AT ALL
SUM OF ALL RESPONSES TO PHQ QUESTIONS 1-9: 0

## 2024-04-08 ASSESSMENT — ENCOUNTER SYMPTOMS
RESPIRATORY NEGATIVE: 1
EYES NEGATIVE: 1
GASTROINTESTINAL NEGATIVE: 1

## 2024-04-08 NOTE — PROGRESS NOTES
\"Have you been to the ER, urgent care clinic since your last visit?  Hospitalized since your last visit?\"    NO    “Have you seen or consulted any other health care providers outside of [facility] since your last visit?”    NO  Chief Complaint   Patient presents with    Knee Pain                 Click Here for Release of Records Request

## 2024-04-08 NOTE — PROGRESS NOTES
Subjective:      Patient ID: Lucy Isaac is a 33 y.o. female here for follow-up.  She has been suffering from left knee pain for last several months.  It used to be right knee pain but right now left knee upper part has been hurting a lot when she is walking.  She is taking diclofenac tablet and diclofenac gel, still hurting her, would like to see an orthopedic.  No fall or injury.  She has autism.  She is able to function well.  Labs reviewed, seems stable.    Knee Pain     Review of Systems   Constitutional: Negative.    HENT: Negative.     Eyes: Negative.    Respiratory: Negative.     Cardiovascular: Negative.    Gastrointestinal: Negative.    Endocrine: Negative.    Genitourinary: Negative.    Musculoskeletal: Negative.    Neurological: Negative.    Hematological: Negative.    Psychiatric/Behavioral: Negative.       Objective:   Physical Exam  Constitutional:       Appearance: Normal appearance.   Cardiovascular:      Rate and Rhythm: Normal rate and regular rhythm.      Pulses: Normal pulses.      Heart sounds: Normal heart sounds.   Pulmonary:      Effort: Pulmonary effort is normal.      Breath sounds: Normal breath sounds.   Abdominal:      General: Abdomen is flat. Bowel sounds are normal.   Musculoskeletal:         General: Tenderness present.      Cervical back: Normal range of motion and neck supple.      Comments: Left knee: Mild tenderness on upper part of the patella.  No patellar dislocation noticed.  Range of motion mildly restricted.   Neurological:      Mental Status: She is alert.   Psychiatric:         Mood and Affect: Mood normal.         Behavior: Behavior normal.         Thought Content: Thought content normal.     Assessment / Plan:       Diagnoses and all orders for this visit:  Patellofemoral arthralgia of left knee    She is already on diclofenac gel and diclofenac tablet.  Still her knee hurts.  Advised her to do quad exercise.    All information about about patella femoral

## 2024-04-13 DIAGNOSIS — R74.8 ELEVATED LIVER ENZYMES: ICD-10-CM

## 2024-04-13 LAB — ALT SERPL-CCNC: 28 IU/L (ref 0–32)

## 2024-07-30 ENCOUNTER — OFFICE VISIT (OUTPATIENT)
Age: 34
End: 2024-07-30
Payer: COMMERCIAL

## 2024-07-30 VITALS
HEART RATE: 100 BPM | RESPIRATION RATE: 16 BRPM | OXYGEN SATURATION: 98 % | WEIGHT: 166.4 LBS | HEIGHT: 64 IN | DIASTOLIC BLOOD PRESSURE: 82 MMHG | SYSTOLIC BLOOD PRESSURE: 110 MMHG | BODY MASS INDEX: 28.41 KG/M2 | TEMPERATURE: 98.2 F

## 2024-07-30 DIAGNOSIS — L60.9 NAIL PROBLEM: Primary | ICD-10-CM

## 2024-07-30 DIAGNOSIS — R45.89 ANXIETY ABOUT HEALTH: ICD-10-CM

## 2024-07-30 DIAGNOSIS — L60.9 NAIL PROBLEM: ICD-10-CM

## 2024-07-30 PROCEDURE — 99213 OFFICE O/P EST LOW 20 MIN: CPT | Performed by: CLINICAL NURSE SPECIALIST

## 2024-07-30 NOTE — PROGRESS NOTES
Chief Complaint   Patient presents with    Finger Pain     Right middle finger        \"Have you been to the ER, urgent care clinic since your last visit?  Hospitalized since your last visit?\"    NO    “Have you seen or consulted any other health care providers outside of [facility] since your last visit?”    NO            Click Here for Release of Records Request

## 2024-07-31 LAB
ALBUMIN SERPL-MCNC: 4.5 G/DL (ref 3.9–4.9)
ALP SERPL-CCNC: 90 IU/L (ref 44–121)
ALT SERPL-CCNC: 16 IU/L (ref 0–32)
AST SERPL-CCNC: 22 IU/L (ref 0–40)
BASOPHILS # BLD AUTO: 0.1 X10E3/UL (ref 0–0.2)
BASOPHILS NFR BLD AUTO: 1 %
BILIRUB SERPL-MCNC: <0.2 MG/DL (ref 0–1.2)
BUN SERPL-MCNC: 18 MG/DL (ref 6–20)
BUN/CREAT SERPL: 24 (ref 9–23)
CALCIUM SERPL-MCNC: 9.7 MG/DL (ref 8.7–10.2)
CHLORIDE SERPL-SCNC: 101 MMOL/L (ref 96–106)
CO2 SERPL-SCNC: 21 MMOL/L (ref 20–29)
CREAT SERPL-MCNC: 0.75 MG/DL (ref 0.57–1)
EGFRCR SERPLBLD CKD-EPI 2021: 106 ML/MIN/1.73
EOSINOPHIL # BLD AUTO: 0.1 X10E3/UL (ref 0–0.4)
EOSINOPHIL NFR BLD AUTO: 1 %
ERYTHROCYTE [DISTWIDTH] IN BLOOD BY AUTOMATED COUNT: 12.8 % (ref 11.7–15.4)
GLOBULIN SER CALC-MCNC: 2.9 G/DL (ref 1.5–4.5)
GLUCOSE SERPL-MCNC: 94 MG/DL (ref 70–99)
HCT VFR BLD AUTO: 41.2 % (ref 34–46.6)
HGB BLD-MCNC: 13.9 G/DL (ref 11.1–15.9)
IMM GRANULOCYTES # BLD AUTO: 0 X10E3/UL (ref 0–0.1)
IMM GRANULOCYTES NFR BLD AUTO: 0 %
LYMPHOCYTES # BLD AUTO: 1.8 X10E3/UL (ref 0.7–3.1)
LYMPHOCYTES NFR BLD AUTO: 28 %
MCH RBC QN AUTO: 28.7 PG (ref 26.6–33)
MCHC RBC AUTO-ENTMCNC: 33.7 G/DL (ref 31.5–35.7)
MCV RBC AUTO: 85 FL (ref 79–97)
MONOCYTES # BLD AUTO: 0.5 X10E3/UL (ref 0.1–0.9)
MONOCYTES NFR BLD AUTO: 8 %
NEUTROPHILS # BLD AUTO: 4 X10E3/UL (ref 1.4–7)
NEUTROPHILS NFR BLD AUTO: 62 %
PLATELET # BLD AUTO: 372 X10E3/UL (ref 150–450)
POTASSIUM SERPL-SCNC: 4.3 MMOL/L (ref 3.5–5.2)
PROT SERPL-MCNC: 7.4 G/DL (ref 6–8.5)
RBC # BLD AUTO: 4.84 X10E6/UL (ref 3.77–5.28)
SODIUM SERPL-SCNC: 140 MMOL/L (ref 134–144)
WBC # BLD AUTO: 6.5 X10E3/UL (ref 3.4–10.8)

## 2024-07-31 ASSESSMENT — ENCOUNTER SYMPTOMS
ABDOMINAL PAIN: 0
SHORTNESS OF BREATH: 0

## 2024-07-31 NOTE — PROGRESS NOTES
Lucy Isaac (:  1990) is a 34 y.o. female,Established patient, here for evaluation of the following chief complaint(s):  Finger Pain (Right middle finger )      Assessment & Plan   1. Nail problem  -     CBC with Auto Differential; Future  -     DULCE by IFA w/Reflex; Future  -     Comprehensive Metabolic Panel; Future  2. Anxiety about health    Physical exam reassuring, possibly consistent with beau line.   unlikely that nailbed abnormality r/t systemic infection.   Provided with reassurance, will obtain labs to r/o underlying cause.  Will follow up pending test results and discuss any additional plan of care.  Encouraged to call with questions or concerns in the interim.     Return if symptoms worsen or fail to improve.       Subjective   Dulce presents for a problem visit. States that she recently noted an indentation to her right middle finger. It does not hurt nor bother her.   Admits that she is just concerned as she does not recall it being present previously. Would like to get it addressed as soon as possible so she doesn't have to worry.   States that she did show the area to her physical therapist who told her that it appears that she injured it. States that she did not injure it recently nor in the past.   Denies any history of skin rashes, hair loss, anemia, or autoimmune disorders.        Review of Systems   Constitutional:  Negative for fatigue and fever.   Respiratory:  Negative for shortness of breath.    Cardiovascular:  Negative for chest pain.   Gastrointestinal:  Negative for abdominal pain.   Musculoskeletal:  Negative for joint swelling.   Skin:  Negative for rash.   Neurological:  Negative for headaches.        21  Past Medical History:   Diagnosis Date    Autism disorder     Cancer (HCC)     kidney,wilms tumor    Psychotic disorder (HCC)     SUN    Seizure (HCC)     Seizures (HCC)     ?epilepsy    Wilm's tumor (nephroblastoma) (HCC)          Objective   Physical

## 2024-08-02 LAB
ANA SER QL IF: NEGATIVE
LABORATORY COMMENT REPORT: NORMAL

## 2024-09-23 NOTE — PROGRESS NOTES
CN XI: Full strength shoulder shrug bilaterally   CN XII: Tongue protrusion full and midline without fasciculation or atrophy  Motor: Normal tone and muscle bulk with no pronator drift. Individual muscle group testing:  Shoulder abduction:   Left:5/5   Right : 5/5    Shoulder adduction:   Left:5/5   Right : 5/5    Elbow flexion:      Left:5/5   Right : 5/5  Elbow extension:    Left:5/5   Right : 5/5   Wrist flexion:     Left:5/5   Right : 5/5  Wrist extension:    Left:5/5   Right : 5/5  Finger flexion:    Left:5/5   Right : 5/5    Finger extension:   Left:5/5   Right : 5/5   Finger abduction:  Left:5/5   Right : 5/5   Finger adduction:   Left:5/5   Right : 5/5  Hip flexion:     Left:5/5   Right : 5/5         Hip extension:   Left:5/5   Right : 5/5    Knee flexion:    Left:5/5   Right : 5/5    Knee extension:   Left:5/5   Right : 5/5    Dorsiflexion:     Left:5/5   Right : 5/5  Plantar flexion:    Left:5/5   Right : 5/5      MSRs: No crossed adductors or clonus. RIGHT  LEFT   Brachioradialis 2+ 2+   Biceps 2+ 2+   Triceps 2+ 2+   Knee 2+ 2+   Achilles 2+ 2+        Plantar response Downward Downward          Sensation: Normal and symmetric perception of pinprick, temperature, light touch, proprioception, and vibration. Coordination: No dysmetria. Normal rapid alternating movements; finger-to-nose and heel-to- shin testing are within normal limits. Gait: Normal native gait. ASSESSMENT:     Diagnosis Orders   1. Weakness of left hand  EMG LIMITED      34year old female presenting for evaluation of subjective left intrinsic hand weakness primarily involving the 1-2nd digits affecting her ability to  since 9/2022, non-progressive since onset. Neurological examination appears non-focal today. She was evaluated by Orthopedic/hand surgery without noted abnormalities-advised a course of physical therapy which she did not complete.  Discussed pursuit of electrodiagnostic testing to exclude proximal
Handoff

## 2025-01-29 SDOH — ECONOMIC STABILITY: FOOD INSECURITY: WITHIN THE PAST 12 MONTHS, THE FOOD YOU BOUGHT JUST DIDN'T LAST AND YOU DIDN'T HAVE MONEY TO GET MORE.: NEVER TRUE

## 2025-01-29 SDOH — ECONOMIC STABILITY: TRANSPORTATION INSECURITY
IN THE PAST 12 MONTHS, HAS LACK OF TRANSPORTATION KEPT YOU FROM MEETINGS, WORK, OR FROM GETTING THINGS NEEDED FOR DAILY LIVING?: NO

## 2025-01-29 SDOH — ECONOMIC STABILITY: INCOME INSECURITY: IN THE LAST 12 MONTHS, WAS THERE A TIME WHEN YOU WERE NOT ABLE TO PAY THE MORTGAGE OR RENT ON TIME?: NO

## 2025-01-29 SDOH — ECONOMIC STABILITY: FOOD INSECURITY: WITHIN THE PAST 12 MONTHS, YOU WORRIED THAT YOUR FOOD WOULD RUN OUT BEFORE YOU GOT MONEY TO BUY MORE.: NEVER TRUE

## 2025-01-29 SDOH — ECONOMIC STABILITY: TRANSPORTATION INSECURITY
IN THE PAST 12 MONTHS, HAS THE LACK OF TRANSPORTATION KEPT YOU FROM MEDICAL APPOINTMENTS OR FROM GETTING MEDICATIONS?: NO

## 2025-01-30 ENCOUNTER — OFFICE VISIT (OUTPATIENT)
Age: 35
End: 2025-01-30
Payer: COMMERCIAL

## 2025-01-30 ENCOUNTER — HOSPITAL ENCOUNTER (OUTPATIENT)
Facility: HOSPITAL | Age: 35
Discharge: HOME | End: 2025-01-30
Payer: COMMERCIAL

## 2025-01-30 VITALS
BODY MASS INDEX: 23.39 KG/M2 | OXYGEN SATURATION: 99 % | RESPIRATION RATE: 16 BRPM | DIASTOLIC BLOOD PRESSURE: 82 MMHG | TEMPERATURE: 97.9 F | HEIGHT: 64 IN | WEIGHT: 137 LBS | SYSTOLIC BLOOD PRESSURE: 112 MMHG | HEART RATE: 99 BPM

## 2025-01-30 DIAGNOSIS — R05.2 SUBACUTE COUGH: Primary | ICD-10-CM

## 2025-01-30 DIAGNOSIS — R05.2 SUBACUTE COUGH: ICD-10-CM

## 2025-01-30 PROCEDURE — 71046 X-RAY EXAM CHEST 2 VIEWS: CPT

## 2025-01-30 PROCEDURE — 99213 OFFICE O/P EST LOW 20 MIN: CPT | Performed by: INTERNAL MEDICINE

## 2025-01-30 RX ORDER — PREDNISONE 20 MG/1
20 TABLET ORAL 2 TIMES DAILY
Qty: 10 TABLET | Refills: 0 | Status: SHIPPED | OUTPATIENT
Start: 2025-01-30 | End: 2025-02-04

## 2025-01-30 ASSESSMENT — PATIENT HEALTH QUESTIONNAIRE - PHQ9
SUM OF ALL RESPONSES TO PHQ QUESTIONS 1-9: 0
1. LITTLE INTEREST OR PLEASURE IN DOING THINGS: NOT AT ALL
SUM OF ALL RESPONSES TO PHQ QUESTIONS 1-9: 0
SUM OF ALL RESPONSES TO PHQ9 QUESTIONS 1 & 2: 0
2. FEELING DOWN, DEPRESSED OR HOPELESS: NOT AT ALL

## 2025-01-30 ASSESSMENT — ENCOUNTER SYMPTOMS
SHORTNESS OF BREATH: 0
COUGH: 1

## 2025-01-30 NOTE — PROGRESS NOTES
\"Have you been to the ER, urgent care clinic since your last visit?  Hospitalized since your last visit?\"    Went to  and was given a medication didn't agree with her stomach, gave her a zpack did not help     “Have you seen or consulted any other health care providers outside our system since your last visit?”    NO             
auscultation.  S1 and S2 heard with regular rate and rhythm in the heart.      Assessment & Plan    Assessment & Plan  1. Persistent dry cough.  The patient has been experiencing a persistent dry cough since October. She has tried various over-the-counter medications and a Z-Derick without relief. A chest x-ray will be ordered today to further investigate the cause. A prescription for steroids, specifically prednisone 20 mg twice daily for 5 days, will be provided. She is advised to start the steroid treatment after the chest x-ray, preferably the following day, to avoid potential sleep disturbances.  She is instructed to complete the full course of steroids if she tolerates them well. Potential side effects, including stimulation and jitteriness, were discussed. If the chest x-ray results are negative and the steroids do not alleviate the cough, further diagnostic tests such as pulmonary function tests may be considered, and a referral to a pulmonologist may be necessary.    Lucy \"Dulce\" was seen today for cough.    Diagnoses and all orders for this visit:    Subacute cough  -     XR CHEST (2 VIEWS); Future  -     predniSONE (DELTASONE) 20 MG tablet; Take 1 tablet by mouth 2 times daily for 5 days      Reviewed with patient medication side effects in detail   I answered all patient questions and concerns  Labs and testing done and/or upcoming labs/test were reviewed and discussed   Reviewed and discussed daily activity, exercise and diet      Return for follow up if symptoms persist, follow up for routine visit or before if needed.     OMAR Cook - NP      The patient (or guardian, if applicable) and other individuals in attendance with the patient were advised that Artificial Intelligence will be utilized during this visit to record and process the conversation to generate a clinical note. The patient (or guardian, if applicable) and other individuals in attendance at the appointment consented to the use